# Patient Record
Sex: FEMALE | Race: WHITE | NOT HISPANIC OR LATINO | Employment: UNEMPLOYED | URBAN - METROPOLITAN AREA
[De-identification: names, ages, dates, MRNs, and addresses within clinical notes are randomized per-mention and may not be internally consistent; named-entity substitution may affect disease eponyms.]

---

## 2018-11-13 ENCOUNTER — OFFICE VISIT (OUTPATIENT)
Dept: NEUROSURGERY | Facility: CLINIC | Age: 53
End: 2018-11-13

## 2018-11-13 VITALS
WEIGHT: 194.4 LBS | SYSTOLIC BLOOD PRESSURE: 141 MMHG | HEART RATE: 73 BPM | HEIGHT: 64 IN | DIASTOLIC BLOOD PRESSURE: 83 MMHG | BODY MASS INDEX: 33.19 KG/M2

## 2018-11-13 DIAGNOSIS — M54.16 LUMBAR RADICULOPATHY: Primary | ICD-10-CM

## 2018-11-13 DIAGNOSIS — M51.36 DDD (DEGENERATIVE DISC DISEASE), LUMBAR: ICD-10-CM

## 2018-11-13 PROBLEM — M51.369 DDD (DEGENERATIVE DISC DISEASE), LUMBAR: Status: ACTIVE | Noted: 2018-11-13

## 2018-11-13 PROCEDURE — 99244 OFF/OP CNSLTJ NEW/EST MOD 40: CPT | Performed by: PHYSICIAN ASSISTANT

## 2018-11-13 NOTE — PROGRESS NOTES
Subjective   Patient ID: Tiana Sena is a 53 y.o. female is being seen for consultation today at the request of Jacoby Torres MD  For back and bilateral leg pain. She denies any recent cause or injury but notes she fell 10 years ago down a few stairs injuring her tailbone.  She has not had any recent treatments. She is not taking any pain medication at this time.     Back Pain   This is a chronic problem. The current episode started more than 1 year ago. The problem occurs constantly. The problem has been gradually worsening since onset. The pain is present in the lumbar spine. The quality of the pain is described as aching, cramping and shooting. The pain is at a severity of 7/10. The pain is moderate. The pain is worse during the night. The symptoms are aggravated by position. Associated symptoms include leg pain, numbness (hands ) and tingling (hands). Pertinent negatives include no bladder incontinence or bowel incontinence. She has tried heat, muscle relaxant and ice for the symptoms. The treatment provided mild relief.   Leg Pain    The pain is present in the left thigh and right thigh. The quality of the pain is described as aching and shooting. The pain has been worsening since onset. Associated symptoms include numbness (hands ) and tingling (hands). She has tried ice and heat for the symptoms. The treatment provided mild relief.       The following portions of the patient's history were reviewed and updated as appropriate: allergies, current medications, past family history, past medical history, past social history, past surgical history and problem list.    Review of Systems   Constitutional: Positive for activity change.   HENT: Positive for postnasal drip and rhinorrhea.    Gastrointestinal: Negative for bowel incontinence.   Genitourinary: Positive for frequency. Negative for bladder incontinence.   Musculoskeletal: Positive for back pain (bilateral thigh pain).   Allergic/Immunologic:  Positive for environmental allergies.   Neurological: Positive for tingling (hands) and numbness (hands ).   All other systems reviewed and are negative.      Objective   Physical Exam   Constitutional: She is oriented to person, place, and time. She appears well-developed and well-nourished.   HENT:   Head: Normocephalic and atraumatic.   Right Ear: External ear normal.   Left Ear: External ear normal.   Eyes: Conjunctivae and EOM are normal. Pupils are equal, round, and reactive to light. Right eye exhibits no discharge. Left eye exhibits no discharge.   Neck: Normal range of motion. Neck supple. No tracheal deviation present.   Pulmonary/Chest: Effort normal. No stridor. No respiratory distress.   Musculoskeletal: Normal range of motion. She exhibits no edema, tenderness or deformity.   Neurological: She is alert and oriented to person, place, and time. She has normal strength and normal reflexes. She displays no atrophy, no tremor and normal reflexes. No cranial nerve deficit or sensory deficit. She exhibits normal muscle tone. She displays a negative Romberg sign. She displays no seizure activity. Coordination and gait normal.   No long tract signs   Skin: Skin is warm and dry.   Psychiatric: She has a normal mood and affect. Her behavior is normal. Judgment and thought content normal.   Nursing note and vitals reviewed.    Neurologic Exam     Mental Status   Oriented to person, place, and time.     Cranial Nerves     CN III, IV, VI   Pupils are equal, round, and reactive to light.  Extraocular motions are normal.     Motor Exam     Strength   Strength 5/5 throughout.       Assessment/Plan   Independent Review of Radiographic Studies:  Did review the lumbar spine MRI from October 10, 2018 at [facility].  It does show multilevel lumbar degenerative disc disease.  There is multilevel facet arthropathy.  There is a fairly large left disc herniation at L4-L5 with severe neural foraminal narrowing on  the left and mild to moderate stenosis.     Medical Decision Making:    Ms. Sena was referred to us by Dr. Dominguez for a 2-3 year history of low back pain with bilateral buttock and lateral leg pain.  The back and leg pain are equal in severity.  She denies any weakness or incontinence or numbness or tingling.  She has never had any formal treatment.  She has tried over-the-counter medications including Aleve or Advil and also naproxen.  The pain is described as a constant burning type pain from the back through the legs.  It worsens with any prolonged or repetitive activity and improves minimally with rest.    Review the MRI as discussed above.  She actually has more leg pain on the right side than the left which does not correlate with the MRI findings.  The left-sided nerve compression at L4-L5 is fairly significant but she does not have any weakness.  I suggested that we try some epidural injections and she is aware of the risks of bleeding, infection and headache.  If they fail to help then a myelogram would be indicated.  She will also call in the interim with any questions or concerns.  Tiana was seen today for back pain and leg pain.    Diagnoses and all orders for this visit:    Lumbar radiculopathy  -     Epidural Block    DDD (degenerative disc disease), lumbar  -     Epidural Block      Return in about 2 months (around 1/13/2019).

## 2018-11-30 ENCOUNTER — ANESTHESIA EVENT (OUTPATIENT)
Dept: PAIN MEDICINE | Facility: HOSPITAL | Age: 53
End: 2018-11-30

## 2018-11-30 ENCOUNTER — ANESTHESIA (OUTPATIENT)
Dept: PAIN MEDICINE | Facility: HOSPITAL | Age: 53
End: 2018-11-30

## 2018-11-30 ENCOUNTER — HOSPITAL ENCOUNTER (OUTPATIENT)
Dept: PAIN MEDICINE | Facility: HOSPITAL | Age: 53
Discharge: HOME | End: 2018-11-30
Admitting: ANESTHESIOLOGY

## 2018-11-30 ENCOUNTER — HOSPITAL ENCOUNTER (OUTPATIENT)
Dept: GENERAL RADIOLOGY | Facility: HOSPITAL | Age: 53
Discharge: HOME | End: 2018-11-30

## 2018-11-30 VITALS
HEART RATE: 81 BPM | WEIGHT: 190 LBS | HEIGHT: 64 IN | RESPIRATION RATE: 16 BRPM | DIASTOLIC BLOOD PRESSURE: 82 MMHG | TEMPERATURE: 98.3 F | BODY MASS INDEX: 32.44 KG/M2 | SYSTOLIC BLOOD PRESSURE: 124 MMHG | OXYGEN SATURATION: 95 %

## 2018-11-30 DIAGNOSIS — R52 PAIN: ICD-10-CM

## 2018-11-30 DIAGNOSIS — M54.16 LUMBAR RADICULOPATHY: Primary | ICD-10-CM

## 2018-11-30 PROCEDURE — 25010000002 MIDAZOLAM PER 1 MG: Performed by: ANESTHESIOLOGY

## 2018-11-30 PROCEDURE — 77003 FLUOROGUIDE FOR SPINE INJECT: CPT

## 2018-11-30 PROCEDURE — 25010000002 METHYLPREDNISOLONE PER 80 MG: Performed by: ANESTHESIOLOGY

## 2018-11-30 PROCEDURE — C1755 CATHETER, INTRASPINAL: HCPCS

## 2018-11-30 RX ORDER — FENTANYL CITRATE 50 UG/ML
50 INJECTION, SOLUTION INTRAMUSCULAR; INTRAVENOUS AS NEEDED
Status: DISCONTINUED | OUTPATIENT
Start: 2018-11-30 | End: 2018-12-01 | Stop reason: HOSPADM

## 2018-11-30 RX ORDER — LIDOCAINE HYDROCHLORIDE 10 MG/ML
1 INJECTION, SOLUTION INFILTRATION; PERINEURAL ONCE AS NEEDED
Status: DISCONTINUED | OUTPATIENT
Start: 2018-11-30 | End: 2018-12-01 | Stop reason: HOSPADM

## 2018-11-30 RX ORDER — MIDAZOLAM HYDROCHLORIDE 1 MG/ML
1 INJECTION INTRAMUSCULAR; INTRAVENOUS AS NEEDED
Status: DISCONTINUED | OUTPATIENT
Start: 2018-11-30 | End: 2018-12-01 | Stop reason: HOSPADM

## 2018-11-30 RX ORDER — SODIUM CHLORIDE 0.9 % (FLUSH) 0.9 %
1-10 SYRINGE (ML) INJECTION AS NEEDED
Status: DISCONTINUED | OUTPATIENT
Start: 2018-11-30 | End: 2018-12-01 | Stop reason: HOSPADM

## 2018-11-30 RX ORDER — SODIUM CHLORIDE 0.9 % (FLUSH) 0.9 %
3-10 SYRINGE (ML) INJECTION AS NEEDED
Status: DISCONTINUED | OUTPATIENT
Start: 2018-11-30 | End: 2018-12-01 | Stop reason: HOSPADM

## 2018-11-30 RX ORDER — SODIUM CHLORIDE 0.9 % (FLUSH) 0.9 %
3 SYRINGE (ML) INJECTION EVERY 12 HOURS SCHEDULED
Status: DISCONTINUED | OUTPATIENT
Start: 2018-11-30 | End: 2018-12-01 | Stop reason: HOSPADM

## 2018-11-30 RX ORDER — METHYLPREDNISOLONE ACETATE 80 MG/ML
80 INJECTION, SUSPENSION INTRA-ARTICULAR; INTRALESIONAL; INTRAMUSCULAR; SOFT TISSUE ONCE
Status: COMPLETED | OUTPATIENT
Start: 2018-11-30 | End: 2018-11-30

## 2018-11-30 RX ADMIN — METHYLPREDNISOLONE ACETATE 80 MG: 80 INJECTION, SUSPENSION INTRA-ARTICULAR; INTRALESIONAL; INTRAMUSCULAR; SOFT TISSUE at 12:22

## 2018-11-30 RX ADMIN — MIDAZOLAM HYDROCHLORIDE 2 MG: 2 INJECTION, SOLUTION INTRAMUSCULAR; INTRAVENOUS at 12:19

## 2018-11-30 NOTE — ANESTHESIA PROCEDURE NOTES
PAIN Epidural block    Pre-sedation assessment completed: 11/30/2018 12:16 PM    Patient reassessed immediately prior to procedure    Patient location during procedure: pain clinic  Start Time: 11/30/2018 12:17 PM  Stop Time: 11/30/2018 12:24 PM  Indication:at surgeon's request and procedure for pain  Performed By  Anesthesiologist: Wilmer Staton MD  Preanesthetic Checklist  Completed: patient identified, site marked, surgical consent, pre-op evaluation, timeout performed, IV checked, risks and benefits discussed and monitors and equipment checked  Additional Notes  Post-Op Diagnosis Codes:     * Lumbar disc herniation (M51.26)     * Lumbar degenerative disc disease (M51.36)  Prep:  Pt Position:prone  Sterile Tech:cap, gloves, mask and sterile barrier  Prep:chlorhexidine gluconate and isopropyl alcohol  Monitoring:blood pressure monitoring, continuous pulse oximetry and EKG  Procedure:  Sedation: yes   Approach:left paramedian  Guidance: fluoroscopy  Location:lumbar  Level:4-5  Needle Type:Yi[employer]ai-shopping  Needle Gauge:17 G  Cath Depth at skin:9 cm  Aspiration:negative  Test Dose:negative  Medications:  Depomedrol:80 mg  Preservative Free Saline:4mL    Post Assessment:  Dressing:occlusive dressing applied  Pt Tolerance:patient tolerated the procedure well with no apparent complications  Complications:no

## 2019-01-04 ENCOUNTER — ANESTHESIA (OUTPATIENT)
Dept: PAIN MEDICINE | Facility: HOSPITAL | Age: 54
End: 2019-01-04

## 2019-01-04 ENCOUNTER — HOSPITAL ENCOUNTER (OUTPATIENT)
Dept: PAIN MEDICINE | Facility: HOSPITAL | Age: 54
Discharge: HOME | End: 2019-01-04
Attending: ANESTHESIOLOGY | Admitting: ANESTHESIOLOGY

## 2019-01-04 ENCOUNTER — ANESTHESIA EVENT (OUTPATIENT)
Dept: PAIN MEDICINE | Facility: HOSPITAL | Age: 54
End: 2019-01-04

## 2019-01-04 ENCOUNTER — HOSPITAL ENCOUNTER (OUTPATIENT)
Dept: GENERAL RADIOLOGY | Facility: HOSPITAL | Age: 54
Discharge: HOME | End: 2019-01-04

## 2019-01-04 VITALS
DIASTOLIC BLOOD PRESSURE: 88 MMHG | SYSTOLIC BLOOD PRESSURE: 121 MMHG | OXYGEN SATURATION: 95 % | RESPIRATION RATE: 16 BRPM | HEART RATE: 80 BPM | TEMPERATURE: 98.1 F

## 2019-01-04 DIAGNOSIS — M51.36 DDD (DEGENERATIVE DISC DISEASE), LUMBAR: Primary | ICD-10-CM

## 2019-01-04 DIAGNOSIS — R52 PAIN: ICD-10-CM

## 2019-01-04 DIAGNOSIS — M54.16 LUMBAR RADICULOPATHY: ICD-10-CM

## 2019-01-04 PROCEDURE — 77003 FLUOROGUIDE FOR SPINE INJECT: CPT

## 2019-01-04 PROCEDURE — 25010000002 METHYLPREDNISOLONE PER 80 MG: Performed by: ANESTHESIOLOGY

## 2019-01-04 PROCEDURE — 0 IOPAMIDOL 41 % SOLUTION: Performed by: ANESTHESIOLOGY

## 2019-01-04 PROCEDURE — C1755 CATHETER, INTRASPINAL: HCPCS

## 2019-01-04 PROCEDURE — 25010000002 MIDAZOLAM PER 1 MG: Performed by: ANESTHESIOLOGY

## 2019-01-04 RX ORDER — LIDOCAINE HYDROCHLORIDE 10 MG/ML
1 INJECTION, SOLUTION INFILTRATION; PERINEURAL ONCE
Status: DISCONTINUED | OUTPATIENT
Start: 2019-01-04 | End: 2019-01-05 | Stop reason: HOSPADM

## 2019-01-04 RX ORDER — METHYLPREDNISOLONE ACETATE 80 MG/ML
80 INJECTION, SUSPENSION INTRA-ARTICULAR; INTRALESIONAL; INTRAMUSCULAR; SOFT TISSUE ONCE
Status: COMPLETED | OUTPATIENT
Start: 2019-01-04 | End: 2019-01-04

## 2019-01-04 RX ORDER — MIDAZOLAM HYDROCHLORIDE 1 MG/ML
1 INJECTION INTRAMUSCULAR; INTRAVENOUS
Status: DISCONTINUED | OUTPATIENT
Start: 2019-01-04 | End: 2019-01-05 | Stop reason: HOSPADM

## 2019-01-04 RX ORDER — FENTANYL CITRATE 50 UG/ML
50 INJECTION, SOLUTION INTRAMUSCULAR; INTRAVENOUS AS NEEDED
Status: DISCONTINUED | OUTPATIENT
Start: 2019-01-04 | End: 2019-01-05 | Stop reason: HOSPADM

## 2019-01-04 RX ORDER — SODIUM CHLORIDE 0.9 % (FLUSH) 0.9 %
3 SYRINGE (ML) INJECTION EVERY 12 HOURS SCHEDULED
Status: DISCONTINUED | OUTPATIENT
Start: 2019-01-04 | End: 2019-01-05 | Stop reason: HOSPADM

## 2019-01-04 RX ORDER — SODIUM CHLORIDE 0.9 % (FLUSH) 0.9 %
3-10 SYRINGE (ML) INJECTION AS NEEDED
Status: DISCONTINUED | OUTPATIENT
Start: 2019-01-04 | End: 2019-01-05 | Stop reason: HOSPADM

## 2019-01-04 RX ADMIN — MIDAZOLAM HYDROCHLORIDE 2 MG: 2 INJECTION, SOLUTION INTRAMUSCULAR; INTRAVENOUS at 09:47

## 2019-01-04 RX ADMIN — IOPAMIDOL 10 ML: 408 INJECTION, SOLUTION INTRATHECAL at 09:50

## 2019-01-04 RX ADMIN — METHYLPREDNISOLONE ACETATE 80 MG: 80 INJECTION, SUSPENSION INTRA-ARTICULAR; INTRALESIONAL; INTRAMUSCULAR; SOFT TISSUE at 09:50

## 2019-01-04 NOTE — H&P
[facility]    History and Physical    Patient Name: Tiana Sena  :  1965  MRN:  [id number]  Date of Admission: 2019    Subjective     Patient is a 53 y.o. female presents with chief complaint of chronic, intermitent, moderate low back, hips: bilateral and leg: bilateral pain.  Onset of symptoms was gradual starting several months ago.  Symptoms are associated/aggravated by activity or twisting. Symptoms improve with rest    The following portions of the patients history were reviewed and updated as appropriate: current medications, allergies, past medical history, past surgical history, past family history, past social history and problem list                Objective     Past Medical History:   Past Medical History:   Diagnosis Date   • Asthma    • Fall    • Low back pain    • Osteoarthritis      Past Surgical History:   Past Surgical History:   Procedure Laterality Date   • ANKLE SURGERY     • ENDOMETRIAL ABLATION     • GANGLION CYST EXCISION      ,    • NASAL SEPTUM SURGERY          • TONSILLECTOMY       Family History:   Family History   Problem Relation Age of Onset   • Cancer Father    • Down syndrome Son      Social History:   Social History     Tobacco Use   • Smoking status: Never Smoker   • Smokeless tobacco: Never Used   Substance Use Topics   • Alcohol use: No   • Drug use: No       Vital Signs Range for the last 24 hours  Temperature:     Temp Source:     BP:     Pulse:     Respirations:     SPO2:     O2 Amount (l/min):     O2 Devices     Weight:           --------------------------------------------------------------------------------    Current Outpatient Medications   Medication Sig Dispense Refill   • fexofenadine (ALLEGRA) 180 MG tablet Take 180 mg by mouth Daily.       Current Facility-Administered Medications   Medication Dose Route Frequency Provider Last Rate Last Dose   • sodium chloride 0.9 % flush 3 mL  3 mL Intravenous Q12H Wilmer Staton MD       •  sodium chloride 0.9 % flush 3-10 mL  3-10 mL Intravenous PRN Wilmer Staton MD           --------------------------------------------------------------------------------  Assessment/Plan      Anesthesia Evaluation     Patient summary reviewed and Nursing notes reviewed   NPO Solid Status: > 8 hours      Pain impairs ability to perform ADLs: Sleeping  Modalities previously tried to control pain with limited effectiveness within the last 4-6 weeks: Rest     Airway   Mallampati: II  TM distance: >3 FB  Neck ROM: full  Dental - normal exam     Pulmonary - normal exam   (+) asthma,   Cardiovascular - negative cardio ROS and normal exam        Neuro/Psych- neuro exam normal  (+) numbness,     GI/Hepatic/Renal/Endo - negative ROS     Musculoskeletal (-) normal exam    (+) back pain, radiculopathy  Abdominal  - normal exam   Substance History - negative use     OB/GYN negative ob/gyn ROS         Other   (+) arthritis                Diagnosis and Plan    Treatment Plan  ASA 2      Procedures: Lumbar Epidural Steroid Injection(LESI), With fluoroscopy,       Anesthetic plan and risks discussed with patient.          Diagnosis     * Lumbar degenerative disc disease [M51.36]     * Lumbar radiculopathy [M54.16]     * Protrusion of lumbar intervertebral disc [M51.26]

## 2019-01-04 NOTE — ANESTHESIA PROCEDURE NOTES
PAIN Epidural block    Pre-sedation assessment completed: 1/4/2019 9:42 AM    Patient reassessed immediately prior to procedure    Patient location during procedure: pain clinic  Start Time: 1/4/2019 9:42 AM  Stop Time: 1/4/2019 9:57 AM  Indication:procedure for pain  Performed By  Anesthesiologist: Dimas Jordan MD  Preanesthetic Checklist  Completed: patient identified, site marked, surgical consent, pre-op evaluation, timeout performed, risks and benefits discussed and monitors and equipment checked  Additional Notes  Depomedrol - 80mg    Needle position confirmed by fluoroscopy and epidurogram using 2cc of [id number].    Diagnosis  Post-Op Diagnosis Codes:     * Lumbar degenerative disc disease (M51.36)     * Lumbar radiculopathy (M54.16)     * Protrusion of lumbar intervertebral disc (M51.26)    Prep:  Pt Position:prone  Sterile Tech:cap, gloves, mask and sterile barrier  Prep:chlorhexidine gluconate and isopropyl alcohol  Monitoring:blood pressure monitoring, continuous pulse oximetry and EKG  Procedure:  Sedation: yes   Approach:left paramedian  Guidance: fluoroscopy  Location:lumbar  Level:4-5  Needle Type:Tuohy  Needle Gauge:20  Aspiration:negative  Medications:  Depomedrol:80 mg  Preservative Free Saline:2mL  Isovue:2mL    Post Assessment:  Post-procedure: bandaide.  Pt Tolerance:patient tolerated the procedure well with no apparent complications  Complications:no

## 2019-01-14 ENCOUNTER — OFFICE VISIT (OUTPATIENT)
Dept: NEUROSURGERY | Facility: CLINIC | Age: 54
End: 2019-01-14

## 2019-01-14 VITALS
BODY MASS INDEX: 33.94 KG/M2 | DIASTOLIC BLOOD PRESSURE: 87 MMHG | HEIGHT: 64 IN | SYSTOLIC BLOOD PRESSURE: 138 MMHG | WEIGHT: 198.8 LBS | HEART RATE: 76 BPM

## 2019-01-14 DIAGNOSIS — M51.36 DDD (DEGENERATIVE DISC DISEASE), LUMBAR: ICD-10-CM

## 2019-01-14 DIAGNOSIS — M54.16 LUMBAR RADICULOPATHY: Primary | ICD-10-CM

## 2019-01-14 PROCEDURE — 99213 OFFICE O/P EST LOW 20 MIN: CPT | Performed by: PHYSICIAN ASSISTANT

## 2019-01-14 RX ORDER — IBUPROFEN 200 MG
200 TABLET ORAL EVERY 6 HOURS PRN
COMMUNITY
End: 2020-07-27

## 2019-02-08 ENCOUNTER — HOSPITAL ENCOUNTER (OUTPATIENT)
Dept: GENERAL RADIOLOGY | Facility: HOSPITAL | Age: 54
Discharge: HOME | End: 2019-02-08

## 2019-02-08 ENCOUNTER — HOSPITAL ENCOUNTER (OUTPATIENT)
Dept: PAIN MEDICINE | Facility: HOSPITAL | Age: 54
Discharge: HOME | End: 2019-02-08
Admitting: ANESTHESIOLOGY

## 2019-02-08 ENCOUNTER — ANESTHESIA EVENT (OUTPATIENT)
Dept: PAIN MEDICINE | Facility: HOSPITAL | Age: 54
End: 2019-02-08

## 2019-02-08 ENCOUNTER — ANESTHESIA (OUTPATIENT)
Dept: PAIN MEDICINE | Facility: HOSPITAL | Age: 54
End: 2019-02-08

## 2019-02-08 VITALS
BODY MASS INDEX: 33.8 KG/M2 | DIASTOLIC BLOOD PRESSURE: 83 MMHG | HEART RATE: 84 BPM | SYSTOLIC BLOOD PRESSURE: 126 MMHG | HEIGHT: 64 IN | OXYGEN SATURATION: 94 % | TEMPERATURE: 97.9 F | RESPIRATION RATE: 16 BRPM | WEIGHT: 198 LBS

## 2019-02-08 DIAGNOSIS — M54.16 LUMBAR RADICULOPATHY: ICD-10-CM

## 2019-02-08 DIAGNOSIS — M51.36 DDD (DEGENERATIVE DISC DISEASE), LUMBAR: ICD-10-CM

## 2019-02-08 DIAGNOSIS — R52 PAIN: ICD-10-CM

## 2019-02-08 PROCEDURE — 77003 FLUOROGUIDE FOR SPINE INJECT: CPT

## 2019-02-08 PROCEDURE — C1755 CATHETER, INTRASPINAL: HCPCS

## 2019-02-08 PROCEDURE — 25010000002 METHYLPREDNISOLONE PER 80 MG: Performed by: ANESTHESIOLOGY

## 2019-02-08 PROCEDURE — 25010000002 MIDAZOLAM PER 1 MG: Performed by: ANESTHESIOLOGY

## 2019-02-08 RX ORDER — SODIUM CHLORIDE 0.9 % (FLUSH) 0.9 %
3-10 SYRINGE (ML) INJECTION AS NEEDED
Status: DISCONTINUED | OUTPATIENT
Start: 2019-02-08 | End: 2019-02-09 | Stop reason: HOSPADM

## 2019-02-08 RX ORDER — METHYLPREDNISOLONE ACETATE 80 MG/ML
80 INJECTION, SUSPENSION INTRA-ARTICULAR; INTRALESIONAL; INTRAMUSCULAR; SOFT TISSUE ONCE
Status: COMPLETED | OUTPATIENT
Start: 2019-02-08 | End: 2019-02-08

## 2019-02-08 RX ORDER — FENTANYL CITRATE 50 UG/ML
50 INJECTION, SOLUTION INTRAMUSCULAR; INTRAVENOUS AS NEEDED
Status: DISCONTINUED | OUTPATIENT
Start: 2019-02-08 | End: 2019-02-09 | Stop reason: HOSPADM

## 2019-02-08 RX ORDER — SODIUM CHLORIDE 0.9 % (FLUSH) 0.9 %
1-10 SYRINGE (ML) INJECTION AS NEEDED
Status: DISCONTINUED | OUTPATIENT
Start: 2019-02-08 | End: 2019-02-09 | Stop reason: HOSPADM

## 2019-02-08 RX ORDER — LIDOCAINE HYDROCHLORIDE 10 MG/ML
0.5 INJECTION, SOLUTION INFILTRATION; PERINEURAL ONCE AS NEEDED
Status: DISCONTINUED | OUTPATIENT
Start: 2019-02-08 | End: 2019-02-09 | Stop reason: HOSPADM

## 2019-02-08 RX ORDER — MIDAZOLAM HYDROCHLORIDE 1 MG/ML
1 INJECTION INTRAMUSCULAR; INTRAVENOUS AS NEEDED
Status: DISCONTINUED | OUTPATIENT
Start: 2019-02-08 | End: 2019-02-09 | Stop reason: HOSPADM

## 2019-02-08 RX ORDER — LIDOCAINE HYDROCHLORIDE 10 MG/ML
1 INJECTION, SOLUTION INFILTRATION; PERINEURAL ONCE AS NEEDED
Status: DISCONTINUED | OUTPATIENT
Start: 2019-02-08 | End: 2019-02-09 | Stop reason: HOSPADM

## 2019-02-08 RX ORDER — SODIUM CHLORIDE 0.9 % (FLUSH) 0.9 %
3 SYRINGE (ML) INJECTION EVERY 12 HOURS SCHEDULED
Status: DISCONTINUED | OUTPATIENT
Start: 2019-02-08 | End: 2019-02-09 | Stop reason: HOSPADM

## 2019-02-08 RX ADMIN — METHYLPREDNISOLONE ACETATE 80 MG: 80 INJECTION, SUSPENSION INTRA-ARTICULAR; INTRALESIONAL; INTRAMUSCULAR; SOFT TISSUE at 08:29

## 2019-02-08 RX ADMIN — MIDAZOLAM HYDROCHLORIDE 2 MG: 2 INJECTION, SOLUTION INTRAMUSCULAR; INTRAVENOUS at 08:25

## 2019-02-08 NOTE — ANESTHESIA PROCEDURE NOTES
PAIN Epidural block      Patient reassessed immediately prior to procedure    Patient location during procedure: pain clinic  Start Time: 2/8/2019 8:23 AM  Stop Time: 2/8/2019 8:35 AM  Indication:procedure for pain  Performed By  Anesthesiologist: Brian Oh MD  Preanesthetic Checklist  Completed: patient identified and risks and benefits discussed  Additional Notes  Diagnosis:     Post-Op Diagnosis Codes:     * Lumbar disc displacement without myelopathy (M51.26)     * Lumbar neuritis (M54.16)    Sedation:  Versed 2 mg    A lumbar epidural steroid injection with fluoroscopic guidance was performed.  Under fluoroscopic guidance, the epidural space was identified and accessed, confirmed by loss of resistance to saline.  The above medications were injected uneventfully.    Prep:  Pt Position:prone  Sterile Tech:cap, gloves, mask and sterile barrier  Prep:chlorhexidine gluconate and isopropyl alcohol  Monitoring:blood pressure monitoring, continuous pulse oximetry and EKG  Procedure:  Sedation: yes   Approach:left paramedian  Guidance: fluoroscopy  Location:lumbar  Level:4-5  Needle Type:Tuohy  Needle Gauge:20  Aspiration:negative  Medications:  Depomedrol:80  Preservative Free Saline:1mL    Post Assessment:  Pt Tolerance:patient tolerated the procedure well with no apparent complications  Complications:no

## 2019-02-08 NOTE — H&P
"INTERVAL HISTORY:    The patient returns for another Lumbar epidural steroid injection today.  Receives about 70% relief in her pain decreases to a 3 out of 10 after an injection.  This lasts about 6 weeks before it begins to subside.  Her pain level now is about a 8 or 9 out of 10.  She is scheduled for physical therapy but for over 6 weeks has been taking ibuprofen and rest without relief .  Brianna shows a displaced disc   Current Outpatient Medications on File Prior to Encounter   Medication Sig Dispense Refill   • fexofenadine (ALLEGRA) 180 MG tablet Take 180 mg by mouth Daily.     • ibuprofen (ADVIL,MOTRIN) 200 MG tablet Take 200 mg by mouth Every 6 (Six) Hours As Needed for Mild Pain .       No current facility-administered medications on file prior to encounter.        Past Medical History:   Diagnosis Date   • Asthma    • Fall 2008   • Low back pain    • Osteoarthritis        No hematologic infectious or constitutional symptoms    Exam:  /98 (BP Location: Left arm, Patient Position: Sitting)   Pulse 82   Temp 36.6 °C (97.9 °F) (Oral)   Resp 16   Ht 161.3 cm (63.5\")   Wt 89.8 kg (198 lb)   SpO2 95%   BMI 34.52 kg/m²   Airway Mallampatti 2  Alert and oriented      Diagnosis:  Post-Op Diagnosis Codes:     * Lumbar disc displacement without myelopathy [M51.26]     * Lumbar neuritis [M54.16]    Plan:  Lumbar 4 epidural steroid injection under fluoroscopic guidance    I have encouraged them to continue:  1.  Physical therapy exercises at home as prescribed by physical therapy or from the pain clinic handout (given to the patient).  Continuation of these exercises every day, or multiple times per week, even when the patient has good pain relief, was stressed to the patient as a preventative measure to decrease the frequency and severity of future pain episodes.  2.  Continue pain medicines as already prescribed.  If patient not currently taking any, it is recommended to begin Acetaminophen 1000 mg po q " 8 hours.  If other medicines containing Acetaminophen are currently prescribed, maintain daily dose at 3000mg.    3.  If they can tolerate NSAIDS, it is recommended to take Ibuprofen 600 mg po q 6 hours for 7 days during pain exacerbations.   Alternatively, they may substitute an NSAID of their choice (e.g. Aleve)  4.  Heat and ice to the affected area as tolerated for pain control.  It was discussed that heating pads can cause burns.  5.  Low impact exercise such as walking or water exercise was recommended to maintain overall health and aid in weight control.   6.  Follow up as needed for subsequent injections.  7.  Patient was counseled to abstain from tobacco products.

## 2019-02-12 NOTE — PROGRESS NOTES
Subjective   Patient ID: Tiana Sena is a 53 y.o. female is here today for follow-up on back and bilateral leg pain after third YOLANDA and PT.  She states YOLANDA were not offering long lasting relief.  She has not started PT but has been doing a few home exercises. Mrs. Sena takes Ibuprofen or Aleve prn for pain.     Back Pain   This is a recurrent problem. The problem occurs constantly. The problem has been gradually improving since onset. The pain is present in the lumbar spine. The pain radiates to the left thigh and right thigh. The pain is at a severity of 7/10. The pain is moderate. Exacerbated by: activity  Associated symptoms include leg pain. Pertinent negatives include no bladder incontinence, bowel incontinence, numbness, perianal numbness, tingling or weakness. Treatments tried: 3 YOLANDA  The treatment provided moderate relief.   Leg Pain    The incident occurred more than 1 week ago. There was no injury mechanism. The pain is present in the left thigh and right thigh. The pain is at a severity of 5/10. The pain is moderate. The pain has been improving since onset. Pertinent negatives include no muscle weakness, numbness or tingling. Treatments tried: 3 YOLANDA  The treatment provided moderate relief.       The following portions of the patient's history were reviewed and updated as appropriate: allergies, current medications, past family history, past medical history, past social history, past surgical history and problem list.    Review of Systems   Gastrointestinal: Negative for bowel incontinence.   Genitourinary: Negative for bladder incontinence and difficulty urinating.   Musculoskeletal: Positive for back pain (bilateral leg pain ).   Neurological: Negative for tingling, weakness and numbness.   All other systems reviewed and are negative.      Objective   Physical Exam   Constitutional: She is oriented to person, place, and time. She appears well-developed and well-nourished.   HENT:   Head:  Normocephalic and atraumatic.   Right Ear: External ear normal.   Left Ear: External ear normal.   Eyes: Conjunctivae and EOM are normal. Pupils are equal, round, and reactive to light. Right eye exhibits no discharge. Left eye exhibits no discharge.   Neck: Normal range of motion. Neck supple. No tracheal deviation present.   Cardiovascular: Intact distal pulses.   Pulmonary/Chest: Effort normal. No stridor. No respiratory distress.   Musculoskeletal: Normal range of motion. She exhibits no edema, tenderness or deformity.   Neurological: She is alert and oriented to person, place, and time. She has normal strength and normal reflexes. She displays no atrophy, no tremor and normal reflexes. No cranial nerve deficit or sensory deficit. She exhibits normal muscle tone. She displays a negative Romberg sign. She displays no seizure activity. Coordination and gait normal.   No long tract signs   Skin: Skin is warm and dry.   Psychiatric: She has a normal mood and affect. Her behavior is normal. Judgment and thought content normal.   Nursing note and vitals reviewed.    Neurologic Exam     Mental Status   Oriented to person, place, and time.     Cranial Nerves     CN III, IV, VI   Pupils are equal, round, and reactive to light.  Extraocular motions are normal.     Motor Exam     Strength   Strength 5/5 throughout.       Assessment/Plan   Independent Review of Radiographic Studies:      Medical Decision Making:    Ms. Sena was here a few months ago for a 2-3 year history of low back pain with bilateral buttock and lateral leg pain. Her MRI showed multilevel lumbar degenerative disc disease and facet arthropathy. There was a fairly large left disc herniation at L4-L5 with severe neural foraminal narrowing on the left and mild to moderate stenosis.  She was referred for epidurals and has now had 3 epidurals with moderate relief.  The low back pain is still constant and varies in severity.  She continues to have right  lateral thigh pain but it is no longer constant and far less severe.  She does now also complaining of left lateral thigh pain which certainly correlates with the left disc herniation.  Thankfully that pain is also intermittent and mild.  Her exam does not reveal any weakness and she denies weakness or bowel or bladder dysfunction. Last time she was here we discussed and ordered PT but she has not yet started it for financial reasons.  At this point even though the radicular pain has improved her back pain is getting progressively worse and the radicular symptoms are still present with prolonged or repetitive activity.  We again discussed the myelogram as well as the associated risks of bleeding, infection and headache.  She would like to proceed to determine if surgery is indicated.  She will follow-up thereafter with Dr. Vaughn to discuss potential surgical intervention.        Tiana was seen today for back pain and leg pain.    Diagnoses and all orders for this visit:    Lumbar radiculopathy  -     No Lab Testing Needed; Standing  -     dexamethasone (DECADRON) 4 MG tablet; Take 2 tablets by mouth Take As Directed. Take both tablets by mouth 2 hours before myelogram  -     XR Spine Lumbar Complete With Flex & Ext; Future  -     IR Myelogram Lumbar Spine; Future  -     CT Lumbar Spine Without Contrast; Future    DDD (degenerative disc disease), lumbar  -     No Lab Testing Needed; Standing  -     dexamethasone (DECADRON) 4 MG tablet; Take 2 tablets by mouth Take As Directed. Take both tablets by mouth 2 hours before myelogram  -     XR Spine Lumbar Complete With Flex & Ext; Future  -     IR Myelogram Lumbar Spine; Future  -     CT Lumbar Spine Without Contrast; Future      Return for follow up after radiology test with Dr. Vaughn.

## 2019-02-25 ENCOUNTER — OFFICE VISIT (OUTPATIENT)
Dept: NEUROSURGERY | Facility: CLINIC | Age: 54
End: 2019-02-25

## 2019-02-25 VITALS
SYSTOLIC BLOOD PRESSURE: 133 MMHG | DIASTOLIC BLOOD PRESSURE: 87 MMHG | HEART RATE: 75 BPM | WEIGHT: 198 LBS | HEIGHT: 64 IN | BODY MASS INDEX: 33.8 KG/M2

## 2019-02-25 DIAGNOSIS — M51.36 DDD (DEGENERATIVE DISC DISEASE), LUMBAR: ICD-10-CM

## 2019-02-25 DIAGNOSIS — M54.16 LUMBAR RADICULOPATHY: Primary | ICD-10-CM

## 2019-02-25 PROCEDURE — 99214 OFFICE O/P EST MOD 30 MIN: CPT | Performed by: PHYSICIAN ASSISTANT

## 2019-02-25 RX ORDER — DEXAMETHASONE 4 MG/1
8 TABLET ORAL TAKE AS DIRECTED
Qty: 2 TABLET | Refills: 0 | Status: SHIPPED | OUTPATIENT
Start: 2019-02-25 | End: 2019-03-07 | Stop reason: HOSPADM

## 2019-03-01 ENCOUNTER — APPOINTMENT (OUTPATIENT)
Dept: PAIN MEDICINE | Facility: HOSPITAL | Age: 54
End: 2019-03-01

## 2019-03-07 ENCOUNTER — HOSPITAL ENCOUNTER (OUTPATIENT)
Dept: RADIOLOGY | Facility: HOSPITAL | Age: 54
Discharge: HOME | End: 2019-03-07

## 2019-03-07 ENCOUNTER — HOSPITAL ENCOUNTER (OUTPATIENT)
Dept: GENERAL RADIOLOGY | Facility: HOSPITAL | Age: 54
Discharge: HOME | End: 2019-03-07
Admitting: NEUROLOGICAL SURGERY

## 2019-03-07 ENCOUNTER — HOSPITAL ENCOUNTER (OUTPATIENT)
Dept: CT IMAGING | Facility: HOSPITAL | Age: 54
Discharge: HOME | End: 2019-03-07

## 2019-03-07 ENCOUNTER — HOSPITAL ENCOUNTER (OUTPATIENT)
Dept: GENERAL RADIOLOGY | Facility: HOSPITAL | Age: 54
Discharge: HOME | End: 2019-03-07

## 2019-03-07 VITALS
HEART RATE: 78 BPM | DIASTOLIC BLOOD PRESSURE: 81 MMHG | SYSTOLIC BLOOD PRESSURE: 125 MMHG | TEMPERATURE: 97.3 F | BODY MASS INDEX: 34.15 KG/M2 | HEIGHT: 64 IN | OXYGEN SATURATION: 97 % | WEIGHT: 200 LBS | RESPIRATION RATE: 16 BRPM

## 2019-03-07 DIAGNOSIS — M54.16 LUMBAR RADICULOPATHY: ICD-10-CM

## 2019-03-07 DIAGNOSIS — M51.36 DDD (DEGENERATIVE DISC DISEASE), LUMBAR: ICD-10-CM

## 2019-03-07 PROCEDURE — 0 IOPAMIDOL 41 % SOLUTION: Performed by: NEUROLOGICAL SURGERY

## 2019-03-07 PROCEDURE — 72240 MYELOGRAPHY NECK SPINE: CPT

## 2019-03-07 PROCEDURE — 72131 CT LUMBAR SPINE W/O DYE: CPT

## 2019-03-07 PROCEDURE — 62304 MYELOGRAPHY LUMBAR INJECTION: CPT

## 2019-03-07 PROCEDURE — 72114 X-RAY EXAM L-S SPINE BENDING: CPT

## 2019-03-07 RX ORDER — HYDROCODONE BITARTRATE AND ACETAMINOPHEN 5; 325 MG/1; MG/1
1 TABLET ORAL EVERY 4 HOURS PRN
Status: DISCONTINUED | OUTPATIENT
Start: 2019-03-07 | End: 2019-03-08 | Stop reason: HOSPADM

## 2019-03-07 RX ORDER — ACETAMINOPHEN 325 MG/1
650 TABLET ORAL EVERY 4 HOURS PRN
Status: DISCONTINUED | OUTPATIENT
Start: 2019-03-07 | End: 2019-03-08 | Stop reason: HOSPADM

## 2019-03-07 RX ORDER — LIDOCAINE HYDROCHLORIDE 10 MG/ML
10 INJECTION, SOLUTION INFILTRATION; PERINEURAL ONCE
Status: COMPLETED | OUTPATIENT
Start: 2019-03-07 | End: 2019-03-07

## 2019-03-07 RX ADMIN — LIDOCAINE HYDROCHLORIDE 6 ML: 10 INJECTION, SOLUTION INFILTRATION; PERINEURAL at 07:39

## 2019-03-07 RX ADMIN — IOPAMIDOL 20 ML: 408 INJECTION, SOLUTION INTRATHECAL at 07:39

## 2019-03-07 RX ADMIN — HYDROCODONE BITARTATE AND ACETAMINOPHEN 1 TABLET: 5; 325 TABLET ORAL at 09:02

## 2019-03-07 NOTE — NURSING NOTE
Verbal and written D/C instructions given to patient and .  She voices understanding and are able to teach back D/C instructions.

## 2019-03-07 NOTE — DISCHARGE INSTRUCTIONS
EDUCATION /DISCHARGE INSTRUCTIONS:    A myelogram is a special radiology procedure of the spinal cord, spinal nerves and other related structures.  You will be awake during the examination.  An area of your lower back will be cleansed with an antiseptic solution.  The physician will inject a numbing medication in your lower back.  While your back is numb, a needle will be placed in the lower back area.  A small amount of spinal fluid may be withdrawn and sent to the lab if ordered by your physician. While the needle is in the back, an injection of a contrast material (xray dye) will be given through the needle.  The contrast material will allow the physician to see the spinal cord and spinal nerves.  Once injected, the needle will be removed and a band aid will be placed over the injection site.  The table will be tilted during the process to allow the contrast material to flow to particular areas in the spine.  Following the injection and xrays, you will be taken to the CT scan where more pictures will be taken. After the procedure is finished, the contrast material will be absorbed by your body and eliminated through your kidneys.  The radiologist will study and interpret your myelogram and send the results to your physician.    Procedure risks of a myelogram include, but are not limited to:  *  Bleeding   *  seizure  *  Infection   *  Headache, possibly severe requiring  *  Contrast reaction      a blood patch  *  Nerve or cord injury  *  Paralysis and death    Benefits of the procedure:  *  Best examination for delineating pathology related to spinal cord compression from a disc and/or nerve root compression    Alternatives to the procedure:  MRI - a non invasive procedure requiring intravenous contrast injection.  Cannot be done on patients with certain pacemakers or metal in the body.  MRI risks include possible reaction to the contrast material, movement of metal located in the body.  Benefit to MRI:   Non-invasive and usually painless procedure.  THIS EDUCATION INFORMATION WAS REVIEWED PRIOR TO PROCEDURE AND CONSENT. Patient initials________________Time_____0722_____________    Important information following your myelogram:    24 hour rest period ends ___1100_________________.    * ACTIVITY:   *  Lie down with your head elevated no more than 2 pillows high today & tonight  *  Sit up to eat your meals and use the restroom, otherwise, lie down.  *  Remain less active for two to three days.  *  Do not drive for 48 hours following a myelogram  *  You may remove the bandage and shower in the morning  *  Increase your fluids for the next 24 hours.  Caffeinated drinks are encouraged.    Resume taking blood thinner or aspirin on __3/8/19 after 1100_____________________________    CALL YOUR PHYSICIAN FOR THE FOLLOWING:  * Pain at the injections site  * Reddness, swelling, bruising or drainage at the injection site.  * A fever by mouth of 101.0  * Any new symptoms    Headaches are a common side effect after a myelogram.  If you get a headache, you should stay flat in bed and drink plenty of fluids. If the headache persist and does not go away with rest/medication, CALL Dr. Vaughn at [phone]

## 2019-03-08 ENCOUNTER — TELEPHONE (OUTPATIENT)
Dept: INTERVENTIONAL RADIOLOGY/VASCULAR | Facility: HOSPITAL | Age: 54
End: 2019-03-08

## 2019-03-13 NOTE — PROGRESS NOTES
Subjective   Patient ID: Tiana Sena is a 53 y.o. female is here today for follow-up with a new Lumbar Myelogram that was ordered for back and bilateral leg pain.    History of Present Illness    This patient continues with pain in her back with radiation into her legs.  It is mostly in the left leg at this point but when it first started it was in the right.  The pain is sharp and stabbing and quite severe.    The following portions of the patient's history were reviewed and updated as appropriate: allergies, current medications, past family history, past medical history, past social history, past surgical history and problem list.    Review of Systems   Respiratory: Negative for chest tightness and shortness of breath.    Cardiovascular: Negative for chest pain.   Musculoskeletal: Positive for back pain.        Bilateral leg pain   All other systems reviewed and are negative.      Objective   Physical Exam   Constitutional: She is oriented to person, place, and time. She appears well-developed and well-nourished.   Neurological: She is oriented to person, place, and time.     Neurologic Exam     Mental Status   Oriented to person, place, and time.       Assessment/Plan   Independent Review of Radiographic Studies:      I reviewed her plain films, myelogram, and CT scan done on 7 March of this year.  The plain films show some degenerative change but not severe.  There is no scoliosis and no abnormal movement on flexion and extension.  On the myelogram itself there is some decreased density in the contrast column on the left side at L4-5 on the AP film.  There is a little narrowing of the lateral recess at that same level on the left and especially with the standing films.  This gets much more significant when she does stand up and there is severe lateral recess stenosis bilaterally at that level.  There is a little pressure on the nerve on the right side at L5-S1.  There is significant disc bulging at that  level on the lateral films.  On the post myelographic CT scan of the lumbar spine there is fairly severe narrowing of the lateral recess at L4-5 consistent with the myelogram films that were taken prone.  It is worse on the left than the right with some disc bulging consistent also with the myelogram films.  L5-S1 mostly looks okay.  There may be a little lateral recess narrowing on the right.    Medical Decision Making:      I told the patient about the test.  I told her that first of all there is a significant difference between prone and standing on the myelogram films.  This is an indication that unless she loses weight there is virtually no chance of getting her back better and keeping it better.  She seemed a little offended at this comment and I tried to explain to her that it is a purely mechanical problem based on the stress placed on the lower spine.    I told her that if the pain is bad enough so as to warrant surgery at this point it would be reasonable to consider either a bilateral aggressive decompression at L4-5 with a fusion or since most of her pain is on the left side minimally invasive surgery just on the left side with a crossover to the right is much as possible.  I told the patient about the risks, complications and expected outcome of the lumbar surgery.  I explained that there was an 80% chance of getting rid of the pain in the leg.  I explained that there would still be back pain after the surgery.  Initially this will be quite severe but will improve over time.  There is a 2 or 3% chance of infection, bleeding, CSF leak, damage to the nerve as a result of surgery, paralysis, as well as anesthetic risk.  There is a 5% chance of late instability which could require a fusion later on and a 10% chance of recurrent disc herniation.  We discussed the postoperative hospital and home course.  I recommended the more minimally invasive surgery.  She would like to think about it and will call if she  wishes for us to do anything further for her.    If she calls she will need to be scheduled for a: Left L4-5 laminectomy and discectomy using metrx    Tiana was seen today for back pain and leg pain.    Diagnoses and all orders for this visit:    Lumbar radiculopathy      Return for 2-3 week post op.

## 2019-03-14 ENCOUNTER — OFFICE VISIT (OUTPATIENT)
Dept: NEUROSURGERY | Facility: CLINIC | Age: 54
End: 2019-03-14

## 2019-03-14 VITALS — SYSTOLIC BLOOD PRESSURE: 144 MMHG | DIASTOLIC BLOOD PRESSURE: 91 MMHG | HEART RATE: 100 BPM

## 2019-03-14 DIAGNOSIS — M54.16 LUMBAR RADICULOPATHY: Primary | ICD-10-CM

## 2019-03-14 PROCEDURE — 99213 OFFICE O/P EST LOW 20 MIN: CPT | Performed by: NEUROLOGICAL SURGERY

## 2019-11-04 ENCOUNTER — TELEPHONE (OUTPATIENT)
Dept: NEUROSURGERY | Facility: CLINIC | Age: 54
End: 2019-11-04

## 2019-11-04 NOTE — TELEPHONE ENCOUNTER
Pt does not want to see Dr Vaughn. She says Dr Vaughn did not introduce himself and pointed his finger at her and said she needs to lose weight before he will operate on her  Pt wants to see Conchita   She tried to explain to him that she had gained the weight after she hurt her back. He told her that did not matter.   Pt wants to see a different doctor.    Spoke with Zeina and she said Dr Arzate will not see  2nd opinions and Dr MARISCAL is out until March. She said for pt to call her PCP.    . Pt is very upset. She says that she has been badly treated and doesn't understand why she should have to go to her PCP.   Please advise

## 2020-05-08 ENCOUNTER — TRANSCRIBE ORDERS (OUTPATIENT)
Dept: ADMINISTRATIVE | Facility: HOSPITAL | Age: 55
End: 2020-05-08

## 2020-05-08 DIAGNOSIS — M47.814 THORACIC SPONDYLOSIS: Primary | ICD-10-CM

## 2020-05-28 ENCOUNTER — APPOINTMENT (OUTPATIENT)
Dept: PAIN MEDICINE | Facility: HOSPITAL | Age: 55
End: 2020-05-28

## 2020-06-11 ENCOUNTER — ANESTHESIA (OUTPATIENT)
Dept: PAIN MEDICINE | Facility: HOSPITAL | Age: 55
End: 2020-06-11

## 2020-06-11 ENCOUNTER — HOSPITAL ENCOUNTER (OUTPATIENT)
Dept: GENERAL RADIOLOGY | Facility: HOSPITAL | Age: 55
Discharge: HOME | End: 2020-06-11

## 2020-06-11 ENCOUNTER — HOSPITAL ENCOUNTER (OUTPATIENT)
Dept: PAIN MEDICINE | Facility: HOSPITAL | Age: 55
Discharge: HOME | End: 2020-06-11
Admitting: ANESTHESIOLOGY

## 2020-06-11 ENCOUNTER — ANESTHESIA EVENT (OUTPATIENT)
Dept: PAIN MEDICINE | Facility: HOSPITAL | Age: 55
End: 2020-06-11

## 2020-06-11 VITALS
TEMPERATURE: 97.7 F | OXYGEN SATURATION: 98 % | HEIGHT: 63 IN | SYSTOLIC BLOOD PRESSURE: 130 MMHG | BODY MASS INDEX: 35.97 KG/M2 | WEIGHT: 203 LBS | HEART RATE: 77 BPM | DIASTOLIC BLOOD PRESSURE: 94 MMHG | RESPIRATION RATE: 16 BRPM

## 2020-06-11 DIAGNOSIS — M48.04 THORACIC SPINAL STENOSIS: Primary | ICD-10-CM

## 2020-06-11 DIAGNOSIS — M54.14 THORACIC RADICULOPATHY: ICD-10-CM

## 2020-06-11 DIAGNOSIS — R52 PAIN: ICD-10-CM

## 2020-06-11 PROCEDURE — 25010000002 MIDAZOLAM PER 1 MG: Performed by: ANESTHESIOLOGY

## 2020-06-11 PROCEDURE — 77003 FLUOROGUIDE FOR SPINE INJECT: CPT

## 2020-06-11 PROCEDURE — 25010000002 DEXAMETHASONE SODIUM PHOSPHATE 10 MG/ML SOLUTION: Performed by: ANESTHESIOLOGY

## 2020-06-11 PROCEDURE — 0 IOPAMIDOL 41 % SOLUTION: Performed by: ANESTHESIOLOGY

## 2020-06-11 RX ORDER — COVID-19 ANTIGEN TEST
2 KIT MISCELLANEOUS DAILY
COMMUNITY
End: 2021-03-22

## 2020-06-11 RX ORDER — DEXAMETHASONE SODIUM PHOSPHATE 10 MG/ML
10 INJECTION, SOLUTION INTRAMUSCULAR; INTRAVENOUS ONCE
Status: COMPLETED | OUTPATIENT
Start: 2020-06-11 | End: 2020-06-11

## 2020-06-11 RX ORDER — SODIUM CHLORIDE 0.9 % (FLUSH) 0.9 %
3 SYRINGE (ML) INJECTION EVERY 12 HOURS SCHEDULED
Status: DISCONTINUED | OUTPATIENT
Start: 2020-06-11 | End: 2020-06-12 | Stop reason: HOSPADM

## 2020-06-11 RX ORDER — FENTANYL CITRATE 50 UG/ML
50 INJECTION, SOLUTION INTRAMUSCULAR; INTRAVENOUS AS NEEDED
Status: DISCONTINUED | OUTPATIENT
Start: 2020-06-11 | End: 2020-06-12 | Stop reason: HOSPADM

## 2020-06-11 RX ORDER — SODIUM CHLORIDE 0.9 % (FLUSH) 0.9 %
3-10 SYRINGE (ML) INJECTION AS NEEDED
Status: DISCONTINUED | OUTPATIENT
Start: 2020-06-11 | End: 2020-06-12 | Stop reason: HOSPADM

## 2020-06-11 RX ORDER — LIDOCAINE HYDROCHLORIDE 10 MG/ML
1 INJECTION, SOLUTION INFILTRATION; PERINEURAL ONCE
Status: COMPLETED | OUTPATIENT
Start: 2020-06-11 | End: 2020-06-11

## 2020-06-11 RX ORDER — ESTRADIOL 1 MG/1
1 TABLET ORAL DAILY
COMMUNITY
Start: 2020-05-22

## 2020-06-11 RX ORDER — MIDAZOLAM HYDROCHLORIDE 1 MG/ML
1 INJECTION INTRAMUSCULAR; INTRAVENOUS AS NEEDED
Status: DISCONTINUED | OUTPATIENT
Start: 2020-06-11 | End: 2020-06-12 | Stop reason: HOSPADM

## 2020-06-11 RX ADMIN — IOPAMIDOL 10 ML: 408 INJECTION, SOLUTION INTRATHECAL at 14:10

## 2020-06-11 RX ADMIN — LIDOCAINE HYDROCHLORIDE 5 ML: 10 INJECTION, SOLUTION EPIDURAL; INFILTRATION; INTRACAUDAL; PERINEURAL at 14:09

## 2020-06-11 RX ADMIN — DEXAMETHASONE SODIUM PHOSPHATE 10 MG: 10 INJECTION INTRAMUSCULAR; INTRAVENOUS at 14:10

## 2020-06-11 RX ADMIN — MIDAZOLAM 2 MG: 1 INJECTION INTRAMUSCULAR; INTRAVENOUS at 14:01

## 2020-06-11 NOTE — H&P
CHIEF COMPLAINT:  Thoracic back pain        HISTORY OF PRESENT ILLNESS:  This patient is complaining of thoracic back pain which radiates right greater than left approximately the T10-T11 level.  It ranges between a 5 and 9 out of 10 on the pain scale.  The pain is described as constant, numb, pressure, tingling, and throbbing in nature. The pain is exacerbated by activity, exercise, lifting, running, sitting, standing, straining, twisting, and walking, and relieved by sleep.  The patient has tried conservative therapy for greater than 6 weeks including: Ice, rest, heat, over-the-counter medication, prescription medication, steroids, and physical therapy.  The pain is significantly decreasing the patient's Activities of Daily Living.  Diagnostic imaging specifically an MRI of her thoracic spine which was signed on 4/6/2020 by the radiologist shows multilevel degenerative changes most pronounced at T10/T11 with moderate to severe bilateral foraminal stenosis.  The full dictation by the radiologist is available in the chart.    This patient was referred to our clinic by MARGIE Samaniego and Melquiades Herring MD for bilateral thoracic transforaminal epidural steroid injections at T10-T11.       PAST MEDICAL HISTORY:  Current Outpatient Medications on File Prior to Encounter   Medication Sig Dispense Refill   • estradiol (ESTRACE) 1 MG tablet Take 1 mg by mouth Daily.     • fexofenadine (ALLEGRA) 180 MG tablet Take 180 mg by mouth Daily.     • Naproxen Sodium 220 MG capsule Take 2 tablets by mouth Daily.     • progesterone (PROMETRIUM) 200 MG capsule 200 mg Daily.     • ibuprofen (ADVIL,MOTRIN) 200 MG tablet Take 200 mg by mouth Every 6 (Six) Hours As Needed for Mild Pain .       No current facility-administered medications on file prior to encounter.        Past Medical History:   Diagnosis Date   • Asthma    • Fall 2008   • Low back pain    • Osteoarthritis        SOCIAL HISTORY:  Counseled to avoid tobacco     REVIEW OF  "SYSTEMS:  No pertinent hematologic, infectious, or constitutional symptoms.  Other review of systems non-contributory     PHYSICAL EXAM:  Ht 160 cm (63\")   Wt 92.1 kg (203 lb)   BMI 35.96 kg/m²   Constitutional: Well-developed well-nourished no acute distress  General: Alert and oriented  Ophtho: EOMI  Airway: Mallampati 2  Cardiac:  Regular rate  Lungs:  Clear to auscultation bilaterally   GI: soft, nontender  Sacroiliac Joints: Noncontributory  Musc: Decreased range of motion and tenderness palpation  Neuro: This patient is describing radicular pain approximately the T10 or T11 level right greater than left going around from the back to the side of the ribs and somewhat anteriorly        DIAGNOSIS:  Post-Op Diagnosis Codes:  Post-Op Diagnosis Codes:     * Thoracic spinal stenosis [M48.04]     * Thoracic radiculopathy [M54.14]     PLAN:  -  Bilateral transforaminal thoracic epidural steroid injections at the planned level of T10-T11 spaced approximately 2-4 weeks apart.  If pain control is acceptable after injection, it was discussed with the patient that they may return for the subsequent injections if and when their pain returns.    - Risks were discussed with the patient, including but not limited to: failure of relief, worsening pain, tissue, nerve, blood vessel or spinal cord damage, post-dural-puncture headache, pneumothorax, bleeding, epidural hematoma, infection, and epidural abscess. Benefits and alternatives were also discussed. No promises were made. Risks/benefits/alternatives of procedural medications were also discussed, including but not limited to hyperglycemia, fluid retention, decreased immune system, osteoporosis, and anaphylactoid reactions. The patient voiced understanding and agreed to proceed.   -  Physical therapy exercises at home should be considered, as tolerated, as prescribed by physical therapy or from the pain clinic handout (given to the patient).  Continuation of these exercises " every day, or multiple times per week, even when the patient has good pain relief, was stressed to the patient as a preventative measure to decrease the frequency and severity of future pain episodes.  -  It is recommended that the patient use the least amount of opioid medication possible.     -  Heat and ice to the affected area as tolerated for pain control.  It was discussed that heating pads can cause burns.  -  Daily low impact exercise such as walking or water exercise was recommended to maintain overall health and aid in weight control.   -  Patient was counseled to abstain from tobacco products.  -  Follow up as needed for subsequent injections.      Cherelle Llanos M.D., [facility], [facility]  Board Certified in Pain Medicine by the American Board of Anesthesiology  Board Certified in Anesthesiology by the American Board of Anesthesiology     Much of this encounter note is an electronic transcription/translation of spoken language to printed text. The electronic translation of spoken language may permit erroneous, or at times, nonsensical words or phrases to be inadvertently transcribed. Although I have reviewed the note for such errors, some may still exist.

## 2020-06-11 NOTE — ANESTHESIA PROCEDURE NOTES
Bilateral transforaminal thoracic epidural steroid injections    Pre-sedation assessment completed: 6/11/2020 1:54 PM    Patient reassessed immediately prior to procedure    Patient location during procedure: pain clinic  Start Time: 6/11/2020 2:00 PM  Stop Time: 6/11/2020 2:11 PM  Indication:procedure for pain  Performed By  Anesthesiologist: Zeus Ingram MD  Preanesthetic Checklist  Completed: patient identified, site marked, surgical consent, pre-op evaluation, timeout performed, IV checked, risks and benefits discussed and monitors and equipment checked  Additional Notes  Post-Op Diagnosis Codes:     * Thoracic spinal stenosis (M48.04)     * Thoracic radiculopathy (M54.14)    The patient was observed in recovery with no evidence of neurological deficits or other problems. All questions were answered. The patient was discharged with appropriate instructions.  Prep:  Pt Position:prone  Sterile Tech:cap, gloves, mask and sterile barrier  Prep:chlorhexidine gluconate and isopropyl alcohol  Monitoring:blood pressure monitoring, continuous pulse oximetry and EKG  Procedure:Sedation: yes     Approach: Classic approaches from the right and left sides.  Guidance: fluoroscopy  Location:thoracic  Level:10-11  Needle Type:Other (Coude Epimed Blunt)  Needle Gauge:20 G  Aspiration:negative  Medications:  Analgesia: Preservative Free Dexamethasone 10mg.  Preservative Free Saline:1mL  Isovue:1mL  Comments:Appropriate spread of contrast.  1 cc of preservative free lidocaine and 5 mg of preservative-free dexamethasone was injected on each side for a total of 2 cc of lidocaine and 10 mg of dexamethasone.    Post Assessment:  Post-procedure: Dressing per nursing.  Pt Tolerance:patient tolerated the procedure well with no apparent complications  Complications:no

## 2020-07-27 ENCOUNTER — HOSPITAL ENCOUNTER (OUTPATIENT)
Dept: PAIN MEDICINE | Facility: HOSPITAL | Age: 55
Discharge: HOME | End: 2020-07-27
Admitting: ANESTHESIOLOGY

## 2020-07-27 ENCOUNTER — ANESTHESIA EVENT (OUTPATIENT)
Dept: PAIN MEDICINE | Facility: HOSPITAL | Age: 55
End: 2020-07-27

## 2020-07-27 ENCOUNTER — HOSPITAL ENCOUNTER (OUTPATIENT)
Dept: GENERAL RADIOLOGY | Facility: HOSPITAL | Age: 55
Discharge: HOME | End: 2020-07-27

## 2020-07-27 ENCOUNTER — ANESTHESIA (OUTPATIENT)
Dept: PAIN MEDICINE | Facility: HOSPITAL | Age: 55
End: 2020-07-27

## 2020-07-27 VITALS
DIASTOLIC BLOOD PRESSURE: 91 MMHG | RESPIRATION RATE: 16 BRPM | TEMPERATURE: 98 F | SYSTOLIC BLOOD PRESSURE: 141 MMHG | HEART RATE: 78 BPM | OXYGEN SATURATION: 96 %

## 2020-07-27 DIAGNOSIS — M48.04 THORACIC SPINAL STENOSIS: ICD-10-CM

## 2020-07-27 DIAGNOSIS — M54.14 THORACIC RADICULOPATHY: ICD-10-CM

## 2020-07-27 DIAGNOSIS — M47.816 SPONDYLOSIS OF LUMBAR REGION WITHOUT MYELOPATHY OR RADICULOPATHY: Primary | ICD-10-CM

## 2020-07-27 DIAGNOSIS — R52 PAIN: ICD-10-CM

## 2020-07-27 PROCEDURE — G0463 HOSPITAL OUTPT CLINIC VISIT: HCPCS

## 2020-07-27 RX ORDER — SODIUM CHLORIDE 0.9 % (FLUSH) 0.9 %
3 SYRINGE (ML) INJECTION EVERY 12 HOURS SCHEDULED
Status: DISCONTINUED | OUTPATIENT
Start: 2020-07-27 | End: 2020-07-28 | Stop reason: HOSPADM

## 2020-07-27 RX ORDER — SODIUM CHLORIDE 0.9 % (FLUSH) 0.9 %
3-10 SYRINGE (ML) INJECTION AS NEEDED
Status: DISCONTINUED | OUTPATIENT
Start: 2020-07-27 | End: 2020-07-28 | Stop reason: HOSPADM

## 2020-07-27 NOTE — ANESTHESIA PROCEDURE NOTES
No procedure was performed today because we are awaiting insurance approval      Patient reassessed immediately prior to procedure    Performed by  Anesthesiologist: Zeus Ingram MD          No procedure was performed today because we are awaiting insurance approval

## 2020-07-27 NOTE — H&P
CHIEF COMPLAINT:  Low back pain        HISTORY OF PRESENT ILLNESS:  This patient is complaining of mid and low back pain which radiates in a bandlike pattern across her lower thoracic and lumbar spine.    Her pain ranges between a 6 and 9 out of 10.  The pain is significantly decreasing the patient's Activities of Daily Living.    Her post myelogram CT from 3/7/2019 was reviewed.  There is multilevel lumbar degeneration and facet arthropathy.  She has had a prior bilateral L4-L5 lumbar discectomy in December 2019.    This patient had a lumbar epidural steroid injection from a different pain group.  She then presented to me and received a bilateral thoracic transforaminal epidural steroid injection at the T10-T11 level per the request of MARGIE Samaniego.  Unfortunately, neither the epidural performed at the outside facility or the bilateral transforaminal epidural steroid injection performed by me were helpful for this patient.    She has since been back to see Dr. Herring who is requesting consideration of interventional procedures to help with pain possibly emanating from her lumbar facets.       PAST MEDICAL HISTORY:  Current Outpatient Medications on File Prior to Encounter   Medication Sig Dispense Refill   • estradiol (ESTRACE) 1 MG tablet Take 1 mg by mouth Daily.     • fexofenadine (ALLEGRA) 180 MG tablet Take 180 mg by mouth Daily.     • Naproxen Sodium 220 MG capsule Take 2 tablets by mouth Daily.     • progesterone (PROMETRIUM) 200 MG capsule 200 mg Daily.     • [DISCONTINUED] ibuprofen (ADVIL,MOTRIN) 200 MG tablet Take 200 mg by mouth Every 6 (Six) Hours As Needed for Mild Pain .       No current facility-administered medications on file prior to encounter.        Past Medical History:   Diagnosis Date   • Asthma    • Fall 2008   • Low back pain    • Osteoarthritis    • Thoracic radiculopathy 6/11/2020   • Thoracic spinal stenosis 6/11/2020       SOCIAL HISTORY:  Counseled to avoid tobacco     REVIEW OF  SYSTEMS:  No pertinent hematologic, infectious, or constitutional symptoms.  Other review of systems non-contributory     PHYSICAL EXAM:  /91 (BP Location: Left arm, Patient Position: Sitting)   Pulse 78   Temp 36.7 °C (98 °F) (Oral)   Resp 16   SpO2 96%   Constitutional: Well-developed well-nourished no acute distress  General: Alert and oriented  Ophtho: EOMI  Airway: Mallampati 2  Cardiac:  Regular rate  Lungs:  Clear to auscultation bilaterally   GI: soft, nontender  Cervical Spine: Noncontributory  Sacroiliac Joints: Noncontributory  Musc: Tenderness to palpation over lower thoracic and lumbar facets  Neuro: Lumbar facet loading is positive bilaterally.  She seems to be the worst at L3-L4, L4-L5, and L5-S1.     DIAGNOSIS:  Post-Op Diagnosis Codes:  Post-Op Diagnosis Codes:     * Spondylosis of lumbar region without myelopathy or radiculopathy [M47.816]     * History of back surgery [Z98.890]     PLAN:  -She is unable to have a procedure today because of insurance approval reasons.  We will plan for bilateral Lumbar facet medial branch injections to cover the level(s) of L3-L4, L4-L5, and L5-S1 spaced approximately 2-4 weeks apart. These injections will be for diagnostic purposes. If pain control is acceptable but temporary, it was discussed with the patient that they may be a candidate for radiofrequency lesioning in the future.     - Risks were discussed with the patient, including but not limited to: failure of relief, worsening pain, tissue, nerve, or blood vessel damage, bleeding, infection, and abscess formation. Benefits and alternatives were also discussed. No promises were made. The patient voiced understanding.  -  Physical therapy exercises at home should be considered, as tolerated, as prescribed by physical therapy or from the pain clinic handout (given to the patient).  Continuation of these exercises every day, or multiple times per week, even when the patient has good pain relief, was  stressed to the patient as a preventative measure to decrease the frequency and severity of future pain episodes.  -  It is recommended that the patient use the least amount of opioid medication possible.     -  Heat and ice to the affected area as tolerated for pain control.  It was discussed that heating pads can cause burns.  -  Daily low impact exercise such as walking or water exercise was recommended to maintain overall health and aid in weight control.   -  Patient was counseled to abstain from tobacco products.  -  Follow up as needed for subsequent injections.  -I will be happy to consider future interventional options per the request of MARGIE Samaniego and/or Melquiades Herring MD.  - She has had a prior bilateral L4-L5 lumbar discectomy in December 2019.      Zeus Ingram M.D.  Partner, Sean and AGUILAR [facility], [facility]  Board Certified in Pain Medicine by the American Board of Anesthesiology  Board Certified in Anesthesiology by the American Board of Anesthesiology     Much of this encounter note is an electronic transcription/translation of spoken language to printed text. The electronic translation of spoken language may permit erroneous, or at times, nonsensical words or phrases to be inadvertently transcribed. Although I have reviewed the note for such errors, some may still exist.

## 2020-08-21 ENCOUNTER — ANESTHESIA (OUTPATIENT)
Dept: PAIN MEDICINE | Facility: HOSPITAL | Age: 55
End: 2020-08-21

## 2020-08-21 ENCOUNTER — HOSPITAL ENCOUNTER (OUTPATIENT)
Dept: GENERAL RADIOLOGY | Facility: HOSPITAL | Age: 55
Discharge: HOME | End: 2020-08-21

## 2020-08-21 ENCOUNTER — ANESTHESIA EVENT (OUTPATIENT)
Dept: PAIN MEDICINE | Facility: HOSPITAL | Age: 55
End: 2020-08-21

## 2020-08-21 ENCOUNTER — HOSPITAL ENCOUNTER (OUTPATIENT)
Dept: PAIN MEDICINE | Facility: HOSPITAL | Age: 55
Discharge: HOME | End: 2020-08-21
Admitting: ANESTHESIOLOGY

## 2020-08-21 VITALS
SYSTOLIC BLOOD PRESSURE: 116 MMHG | TEMPERATURE: 98.6 F | OXYGEN SATURATION: 99 % | HEART RATE: 81 BPM | RESPIRATION RATE: 16 BRPM | DIASTOLIC BLOOD PRESSURE: 86 MMHG

## 2020-08-21 DIAGNOSIS — M47.816 SPONDYLOSIS OF LUMBAR REGION WITHOUT MYELOPATHY OR RADICULOPATHY: ICD-10-CM

## 2020-08-21 DIAGNOSIS — R52 PAIN: ICD-10-CM

## 2020-08-21 PROCEDURE — 77003 FLUOROGUIDE FOR SPINE INJECT: CPT

## 2020-08-21 PROCEDURE — 25010000002 ROPIVACAINE PER 1 MG: Performed by: ANESTHESIOLOGY

## 2020-08-21 PROCEDURE — 25010000002 FENTANYL CITRATE (PF) 100 MCG/2ML SOLUTION: Performed by: ANESTHESIOLOGY

## 2020-08-21 PROCEDURE — 25010000002 MIDAZOLAM PER 1 MG: Performed by: ANESTHESIOLOGY

## 2020-08-21 RX ORDER — SODIUM CHLORIDE 0.9 % (FLUSH) 0.9 %
3-10 SYRINGE (ML) INJECTION AS NEEDED
Status: DISCONTINUED | OUTPATIENT
Start: 2020-08-21 | End: 2020-08-22 | Stop reason: HOSPADM

## 2020-08-21 RX ORDER — FEXOFENADINE HCL 180 MG/1
TABLET ORAL
COMMUNITY

## 2020-08-21 RX ORDER — MIDAZOLAM HYDROCHLORIDE 1 MG/ML
1 INJECTION INTRAMUSCULAR; INTRAVENOUS AS NEEDED
Status: DISCONTINUED | OUTPATIENT
Start: 2020-08-21 | End: 2020-08-22 | Stop reason: HOSPADM

## 2020-08-21 RX ORDER — SODIUM CHLORIDE 0.9 % (FLUSH) 0.9 %
3 SYRINGE (ML) INJECTION EVERY 12 HOURS SCHEDULED
Status: DISCONTINUED | OUTPATIENT
Start: 2020-08-21 | End: 2020-08-22 | Stop reason: HOSPADM

## 2020-08-21 RX ORDER — FENTANYL CITRATE 50 UG/ML
50 INJECTION, SOLUTION INTRAMUSCULAR; INTRAVENOUS AS NEEDED
Status: DISCONTINUED | OUTPATIENT
Start: 2020-08-21 | End: 2020-08-22 | Stop reason: HOSPADM

## 2020-08-21 RX ORDER — LIDOCAINE HYDROCHLORIDE 10 MG/ML
1 INJECTION, SOLUTION INFILTRATION; PERINEURAL ONCE
Status: DISCONTINUED | OUTPATIENT
Start: 2020-08-21 | End: 2020-08-22 | Stop reason: HOSPADM

## 2020-08-21 RX ORDER — ROPIVACAINE HYDROCHLORIDE 5 MG/ML
20 INJECTION, SOLUTION EPIDURAL; INFILTRATION; PERINEURAL ONCE
Status: COMPLETED | OUTPATIENT
Start: 2020-08-21 | End: 2020-08-21

## 2020-08-21 RX ADMIN — FENTANYL CITRATE 50 MCG: 50 INJECTION, SOLUTION INTRAMUSCULAR; INTRAVENOUS at 09:30

## 2020-08-21 RX ADMIN — ROPIVACAINE HYDROCHLORIDE 30 ML: 5 INJECTION, SOLUTION EPIDURAL; INFILTRATION; PERINEURAL at 09:40

## 2020-08-21 RX ADMIN — MIDAZOLAM 2 MG: 1 INJECTION INTRAMUSCULAR; INTRAVENOUS at 09:30

## 2020-08-21 NOTE — ANESTHESIA PROCEDURE NOTES
Bilateral lumbar facet medial branch diagnostic injections to cover the levels of L3-L4, L4-L5, and L5-S1    Pre-sedation assessment completed: 8/21/2020 9:21 AM    Patient reassessed immediately prior to procedure    Patient location during procedure: Pain Clinic  Start time: 8/21/2020 9:26 AM  Stop Time: 8/21/2020 9:40 AM    Reason for block: procedure for pain  Performed by  Anesthesiologist: Zeus Ingram MD  Preanesthetic Checklist  Completed: patient identified, site marked, surgical consent, pre-op evaluation, timeout performed, IV checked, risks and benefits discussed and monitors and equipment checked  Prep:  Patient position: prone  Sterile barriers:cap, gloves, mask and sterile barrier  Prep: ChloraPrep  Patient monitoring: blood pressure monitoring, continuous pulse oximetry and EKG  Procedure:  Sedation:yes  Approach:bilateral  Guidance:fluoroscopy  Location:lumbar  Interspace: to cover the levels of L3-L4, L4-L5, and L5-S1.  Needle Type:Quincke  Needle Gauge:25 G  Aspiration:negative  Medications:  Comment:With 0.5% Ropivacaine, 0.5 mls per injection site.     Post Assessment  Injection Assessment: negative  Patient Tolerance:comfortable throughout block  Complications:no  Additional Notes  Post-Op Diagnosis Codes:     * Spondylosis of lumbar region without myelopathy or radiculopathy (M47.816)     * History of back surgery (Z98.890)    The patient was observed in recovery with no evidence of neurological deficits or other problems. All questions were answered. The patient was discharged with appropriate instructions.

## 2020-08-21 NOTE — H&P
CHIEF COMPLAINT:  Low back pain        HISTORY OF PRESENT ILLNESS:  This patient is complaining of mid and low back pain which radiates in a bandlike pattern across her lower thoracic and lumbar spine.    Her pain ranges between a 6 and 9 out of 10.  The pain is significantly decreasing the patient's Activities of Daily Living.    Her post myelogram CT from 3/7/2019 was reviewed.  There is multilevel lumbar degeneration and facet arthropathy.  She has had a prior bilateral L4-L5 lumbar discectomy in December 2019.     This patient had a lumbar epidural steroid injection from a different pain group.  She then presented to me and received a bilateral thoracic transforaminal epidural steroid injection at the T10-T11 level per the request of MARGIE Samaniego.  Unfortunately, neither the epidural performed at the outside facility or the bilateral transforaminal epidural steroid injection performed by me were helpful for this patient.     She has since been back to see Dr. Herring who is requesting consideration of interventional procedures to help with pain possibly emanating from her lumbar facets.     PAST MEDICAL HISTORY:  Current Outpatient Medications on File Prior to Encounter   Medication Sig Dispense Refill   • estradiol (ESTRACE) 1 MG tablet Take 1 mg by mouth Daily.     • fexofenadine (ALLEGRA) 180 MG tablet Take 180 mg by mouth Daily.     • Naproxen Sodium 220 MG capsule Take 2 tablets by mouth Daily.     • progesterone (PROMETRIUM) 200 MG capsule 200 mg Daily.       No current facility-administered medications on file prior to encounter.        Past Medical History:   Diagnosis Date   • Asthma    • Fall 2008   • Low back pain    • Osteoarthritis    • Spondylosis of lumbar region without myelopathy or radiculopathy 7/27/2020   • Thoracic radiculopathy 6/11/2020   • Thoracic spinal stenosis 6/11/2020       SOCIAL HISTORY:  Counseled to avoid tobacco     REVIEW OF SYSTEMS:  No pertinent hematologic, infectious,  or constitutional symptoms.  Other review of systems non-contributory     PHYSICAL EXAM:  /95 (BP Location: Left arm, Patient Position: Sitting)   Pulse 84   Temp 37 °C (98.6 °F) (Oral)   Resp 16   SpO2 96%   Constitutional: Well-developed well-nourished no acute distress  General: Alert and oriented  Ophtho: EOMI  Airway: Mallampati 2  Cardiac:  Regular rate  Lungs:  Clear to auscultation bilaterally   GI: soft, nontender  Cervical Spine: Noncontributory  Sacroiliac Joints: Noncontributory  Musc: Tenderness palpation over the lower thoracic and lumbar facets  Neuro: Lumbar facet loading is positive bilaterally particularly at the lower 3 levels.     DIAGNOSIS:  Post-Op Diagnosis Codes:  Post-Op Diagnosis Codes:     * Spondylosis of lumbar region without myelopathy or radiculopathy [M47.816]     * History of back surgery [Z98.890]     PLAN:  -Bilateral Lumbar facet medial branch injections to cover the level(s) of L3-L4, L4-L5, and L5-S1 spaced approximately 2-4 weeks apart. These injections will be for diagnostic purposes. If pain control is acceptable but temporary, it was discussed with the patient that they may be a candidate for radiofrequency lesioning in the future.     - Risks were discussed with the patient, including but not limited to: failure of relief, worsening pain, tissue, nerve, or blood vessel damage, bleeding, infection, and abscess formation. Benefits and alternatives were also discussed. No promises were made. The patient voiced understanding.  -  Physical therapy exercises at home should be considered, as tolerated, as prescribed by physical therapy or from the pain clinic handout (given to the patient).  Continuation of these exercises every day, or multiple times per week, even when the patient has good pain relief, was stressed to the patient as a preventative measure to decrease the frequency and severity of future pain episodes.  -  It is recommended that the patient use the  least amount of opioid medication possible.     -  Heat and ice to the affected area as tolerated for pain control.  It was discussed that heating pads can cause burns.  -  Daily low impact exercise such as walking or water exercise was recommended to maintain overall health and aid in weight control.   -  Patient was counseled to abstain from tobacco products.  -  Follow up as needed for subsequent injections.  -I will be happy to consider future interventional options per the request of MARGIE Samaniego and/or Melquiades Herring MD.  - She has had a prior bilateral L4-L5 lumbar discectomy in December 2019.  -She is continuing to have pain in her lower thoracic region as well and is requesting that we consider future thoracic facet treatments at the lower levels.  We have decided to go ahead and focus on the lumbar area today.    Zeus Ingram M.D.  Jos[facility], [facility], [facility]  Board Certified in Pain Medicine by the American Board of Anesthesiology  Board Certified in Anesthesiology by the American Board of Anesthesiology     Much of this encounter note is an electronic transcription/translation of spoken language to printed text. The electronic translation of spoken language may permit erroneous, or at times, nonsensical words or phrases to be inadvertently transcribed. Although I have reviewed the note for such errors, some may still exist.

## 2020-09-03 ENCOUNTER — APPOINTMENT (OUTPATIENT)
Dept: PAIN MEDICINE | Facility: HOSPITAL | Age: 55
End: 2020-09-03

## 2020-09-28 ENCOUNTER — HOSPITAL ENCOUNTER (OUTPATIENT)
Dept: PAIN MEDICINE | Facility: HOSPITAL | Age: 55
Discharge: HOME | End: 2020-09-28

## 2020-09-28 ENCOUNTER — ANESTHESIA (OUTPATIENT)
Dept: PAIN MEDICINE | Facility: HOSPITAL | Age: 55
End: 2020-09-28

## 2020-09-28 ENCOUNTER — ANESTHESIA EVENT (OUTPATIENT)
Dept: PAIN MEDICINE | Facility: HOSPITAL | Age: 55
End: 2020-09-28

## 2020-09-28 ENCOUNTER — HOSPITAL ENCOUNTER (OUTPATIENT)
Dept: GENERAL RADIOLOGY | Facility: HOSPITAL | Age: 55
Discharge: HOME | End: 2020-09-28

## 2020-09-28 VITALS
DIASTOLIC BLOOD PRESSURE: 77 MMHG | HEART RATE: 75 BPM | RESPIRATION RATE: 14 BRPM | SYSTOLIC BLOOD PRESSURE: 116 MMHG | OXYGEN SATURATION: 95 % | TEMPERATURE: 97.5 F

## 2020-09-28 DIAGNOSIS — R52 PAIN: ICD-10-CM

## 2020-09-28 DIAGNOSIS — M47.816 SPONDYLOSIS OF LUMBAR REGION WITHOUT MYELOPATHY OR RADICULOPATHY: Primary | ICD-10-CM

## 2020-09-28 PROCEDURE — 25010000002 FENTANYL CITRATE (PF) 100 MCG/2ML SOLUTION: Performed by: ANESTHESIOLOGY

## 2020-09-28 PROCEDURE — 25010000002 ROPIVACAINE PER 1 MG: Performed by: ANESTHESIOLOGY

## 2020-09-28 PROCEDURE — 25010000002 MIDAZOLAM PER 1 MG: Performed by: ANESTHESIOLOGY

## 2020-09-28 PROCEDURE — 77003 FLUOROGUIDE FOR SPINE INJECT: CPT

## 2020-09-28 RX ORDER — MIDAZOLAM HYDROCHLORIDE 1 MG/ML
1 INJECTION INTRAMUSCULAR; INTRAVENOUS AS NEEDED
Status: DISCONTINUED | OUTPATIENT
Start: 2020-09-28 | End: 2020-09-29 | Stop reason: HOSPADM

## 2020-09-28 RX ORDER — SODIUM CHLORIDE 0.9 % (FLUSH) 0.9 %
3-10 SYRINGE (ML) INJECTION AS NEEDED
Status: DISCONTINUED | OUTPATIENT
Start: 2020-09-28 | End: 2020-09-29 | Stop reason: HOSPADM

## 2020-09-28 RX ORDER — ROPIVACAINE HYDROCHLORIDE 5 MG/ML
20 INJECTION, SOLUTION EPIDURAL; INFILTRATION; PERINEURAL ONCE
Status: COMPLETED | OUTPATIENT
Start: 2020-09-28 | End: 2020-09-28

## 2020-09-28 RX ORDER — FENTANYL CITRATE 50 UG/ML
50 INJECTION, SOLUTION INTRAMUSCULAR; INTRAVENOUS AS NEEDED
Status: DISCONTINUED | OUTPATIENT
Start: 2020-09-28 | End: 2020-09-29 | Stop reason: HOSPADM

## 2020-09-28 RX ORDER — SODIUM CHLORIDE 0.9 % (FLUSH) 0.9 %
3 SYRINGE (ML) INJECTION EVERY 12 HOURS SCHEDULED
Status: DISCONTINUED | OUTPATIENT
Start: 2020-09-28 | End: 2020-09-29 | Stop reason: HOSPADM

## 2020-09-28 RX ORDER — LIDOCAINE HYDROCHLORIDE 10 MG/ML
1 INJECTION, SOLUTION INFILTRATION; PERINEURAL ONCE
Status: DISCONTINUED | OUTPATIENT
Start: 2020-09-28 | End: 2020-09-29 | Stop reason: HOSPADM

## 2020-09-28 RX ADMIN — FENTANYL CITRATE 50 MCG: 50 INJECTION, SOLUTION INTRAMUSCULAR; INTRAVENOUS at 09:18

## 2020-09-28 RX ADMIN — ROPIVACAINE HYDROCHLORIDE 30 ML: 5 INJECTION, SOLUTION EPIDURAL; INFILTRATION; PERINEURAL at 08:20

## 2020-09-28 RX ADMIN — MIDAZOLAM 2 MG: 1 INJECTION INTRAMUSCULAR; INTRAVENOUS at 09:18

## 2020-09-28 NOTE — ANESTHESIA PROCEDURE NOTES
Bilateral diagnostic lumbar facet medial branch blocks to cover the levels of L3-L4, L4-L5, and L5-S1    Pre-sedation assessment completed: 9/28/2020 9:09 AM    Patient reassessed immediately prior to procedure    Patient location during procedure: Pain Clinic  Start time: 9/28/2020 9:19 AM  Stop Time: 9/28/2020 9:30 AM    Reason for block: procedure for pain  Performed by  Anesthesiologist: Zeus Ingram MD  Preanesthetic Checklist  Completed: patient identified, site marked, surgical consent, pre-op evaluation, timeout performed, IV checked, risks and benefits discussed and monitors and equipment checked  Prep:  Patient position: prone  Sterile barriers:cap, gloves, mask and sterile barrier  Prep: ChloraPrep  Patient monitoring: blood pressure monitoring, continuous pulse oximetry and EKG  Procedure:  Sedation:yes  Approach:bilateral  Guidance:fluoroscopy  Location:lumbar  Interspace: to cover the levels of L3-L4, L4-L5, and L5-S1.  Needle Type:Quincke  Needle Gauge:25 G  Aspiration:negative  Medications:  Comment:With 0.5% Ropivacaine, 0.5 mls per injection site.     Post Assessment  Injection Assessment: negative  Patient Tolerance:comfortable throughout block  Complications:no  Additional Notes  Post-Op Diagnosis Codes:     * Spondylosis of lumbar region without myelopathy or radiculopathy (M47.816)     * History of back surgery (Z98.890)    The patient was observed in recovery with no evidence of neurological deficits or other problems. All questions were answered. The patient was discharged with appropriate instructions.

## 2020-09-28 NOTE — H&P
CHIEF COMPLAINT:  Low back pain        HISTORY OF PRESENT ILLNESS:  This patient is complaining of low back pain which radiates in a bandlike pattern across her lower thoracic and lumbar spine areas.  It ranges between a 5 and 9 out of 10 on the pain scale.  The pain is significantly decreasing the patient's Activities of Daily Living.       Her post myelogram CT from 3/7/2019 was reviewed.  There is multilevel lumbar degeneration and facet arthropathy.  She has had a prior bilateral L4-L5 lumbar discectomy in December 2019.    This patient had a lumbar epidural steroid injection from a different pain group.  She then presented to me and received a bilateral thoracic transforaminal epidural steroid injection at the T10-T11 level per the request of MARGIE Samaniego.  Unfortunately, neither the epidural performed at the outside facility or the bilateral transforaminal epidural steroid injection performed by me were helpful for this patient.     She had since been back to see Dr. Herring who requested consideration of interventional procedures to help with pain possibly emanating from her lumbar and/or thoracic facets.    At her last visit, we performed bilateral lumbar facet medial branch blocks to cover the levels of L3-L4, L4-L5, and L5-S1.  This patient states that the relief was good for several hours after the procedure as would be expected.  She states that the relief then faded after the diagnostic local anesthetic wore off.  Unfortunately, she is having a little trouble putting an exact percentage on her relief since she also has some pain in her lower thoracic area.    The patient is requesting that we perform diagnostic blocks one more time as planned.  She will try to pay extra close attention this time so that she can give us a more exact percentage of pain relief for the lumbar facet pain.     PAST MEDICAL HISTORY:  Current Outpatient Medications on File Prior to Encounter   Medication Sig Dispense Refill    • B Complex Vitamins (VITAMIN B COMPLEX PO) Take  by mouth.     • estradiol (ESTRACE) 1 MG tablet Take 1 mg by mouth Daily.     • fexofenadine (ALLEGRA) 180 MG tablet fexofenadine 180 mg tablet   Take 1 tablet every day by oral route.     • Naproxen Sodium 220 MG capsule Take 2 tablets by mouth Daily.     • Probiotic Product (PROBIOTIC DAILY PO) Take  by mouth.     • progesterone (PROMETRIUM) 200 MG capsule 200 mg Daily.     • VITAMIN D PO Take  by mouth.     • VITAMIN E PO Take  by mouth.     • [DISCONTINUED] fexofenadine (ALLEGRA) 180 MG tablet Take 180 mg by mouth Daily.       No current facility-administered medications on file prior to encounter.        Past Medical History:   Diagnosis Date   • Asthma    • Fall 2008   • Low back pain    • Osteoarthritis    • Spondylosis of lumbar region without myelopathy or radiculopathy 7/27/2020   • Thoracic radiculopathy 6/11/2020   • Thoracic spinal stenosis 6/11/2020       SOCIAL HISTORY:  Counseled to avoid tobacco     REVIEW OF SYSTEMS:  No pertinent hematologic, infectious, or constitutional symptoms.  Other review of systems non-contributory     PHYSICAL EXAM:  /81 (BP Location: Left arm, Patient Position: Sitting)   Pulse 80   Temp 36.4 °C (97.5 °F) (Infrared)   Resp 14   SpO2 96%   Constitutional: Well-developed well-nourished no acute distress  General: Alert and oriented  Ophtho: EOMI  Airway: Mallampati 2  Cardiac:  Regular rate  Lungs:  Clear to auscultation bilaterally   GI: soft, nontender  Cervical Spine: Noncontributory  Sacroiliac Joints: Noncontributory  Musc: Tenderness palpation over the lower thoracic and lumbar facets  Neuro: Facet loading is positive bilaterally particularly the lower 3 lumbar levels.        DIAGNOSIS:  Post-Op Diagnosis Codes:  Post-Op Diagnosis Codes:     * Spondylosis of lumbar region without myelopathy or radiculopathy [M47.816]     * History of back surgery [Z98.890]     PLAN:  -Bilateral Lumbar facet medial branch  injections to cover the level(s) of L3-L4, L4-L5, and L5-S1 spaced approximately 2-4 weeks apart. These injections will be for diagnostic purposes. If pain control is good but temporary, it was discussed with the patient that she will be a candidate for radiofrequency lesioning in the future.     - Risks were discussed with the patient, including but not limited to: failure of relief, worsening pain, tissue, nerve, or blood vessel damage, bleeding, infection, and abscess formation. Benefits and alternatives were also discussed. No promises were made. The patient voiced understanding.  -  Physical therapy exercises at home should be considered, as tolerated, as prescribed by physical therapy or from the pain clinic handout (given to the patient).  Continuation of these exercises every day, or multiple times per week, even when the patient has good pain relief, was stressed to the patient as a preventative measure to decrease the frequency and severity of future pain episodes.  -  It is recommended that the patient use the least amount of opioid medication possible.     -  Heat and ice to the affected area as tolerated for pain control.  It was discussed that heating pads can cause burns.  -  Daily low impact exercise such as walking or water exercise was recommended to maintain overall health and aid in weight control.   -  Patient was counseled to abstain from tobacco products.  -  Follow up as needed for subsequent injections.  -I will be happy to consider future interventional options per the request of MARGIE Samaniego and/or Melquiades Herring MD.  - She has had a prior bilateral L4-L5 lumbar discectomy in December 2019.  -She is continuing to have pain in her lower thoracic region as well and is requesting that we consider future thoracic facet treatments at the lower levels.  We have decided to continue to focus on the lumbar area today.    Zeus Ingram M.D.  Partner, [facility], [facility], [facility]  Board Certified in Pain Medicine by the American Board of Anesthesiology  Board Certified in Anesthesiology by the American Board of Anesthesiology     Much of this encounter note is an electronic transcription/translation of spoken language to printed text. The electronic translation of spoken language may permit erroneous, or at times, nonsensical words or phrases to be inadvertently transcribed. Although I have reviewed the note for such errors, some may still exist.

## 2020-10-01 ENCOUNTER — TELEPHONE (OUTPATIENT)
Dept: PAIN MEDICINE | Facility: HOSPITAL | Age: 55
End: 2020-10-01

## 2020-10-01 NOTE — TELEPHONE ENCOUNTER
Patient called her h/a since her treatment on Monday bilateral diagnostic lumbar facet medial blocls to cover L3L4, L4L5,and L5S1 by Dr. Zeus Ingram. She states her come and go and are not characteristic of a spinal h/a. When talking with Dr. Zeus Ingram he said that the medial branch block would not cause a spinal h/a

## 2020-10-01 NOTE — TELEPHONE ENCOUNTER
Pt called c/o of off and on H/a since she had her treatment on Monday September 29 from Dr. Ingram.Pt had a bilateral medial block branch on L3L4, L4L5 and L5S1. When talked with Dr. Ingram he said that that procedure is not near the spinal fluid and that procedure would not casue a spinal h/a like some other procedures could. Explained that she should reat and take medication like tylenol to see if that helped if it continues to call her primary or if if it gets bad she could always go the ER. Pt verbalized understanding.

## 2020-11-05 ENCOUNTER — ANESTHESIA EVENT (OUTPATIENT)
Dept: PAIN MEDICINE | Facility: HOSPITAL | Age: 55
End: 2020-11-05

## 2020-11-05 ENCOUNTER — HOSPITAL ENCOUNTER (OUTPATIENT)
Dept: PAIN MEDICINE | Facility: HOSPITAL | Age: 55
Discharge: HOME | End: 2020-11-05

## 2020-11-05 ENCOUNTER — ANESTHESIA (OUTPATIENT)
Dept: PAIN MEDICINE | Facility: HOSPITAL | Age: 55
End: 2020-11-05

## 2020-11-05 ENCOUNTER — HOSPITAL ENCOUNTER (OUTPATIENT)
Dept: GENERAL RADIOLOGY | Facility: HOSPITAL | Age: 55
Discharge: HOME | End: 2020-11-05

## 2020-11-05 VITALS
OXYGEN SATURATION: 95 % | WEIGHT: 165 LBS | BODY MASS INDEX: 29.23 KG/M2 | HEART RATE: 73 BPM | SYSTOLIC BLOOD PRESSURE: 118 MMHG | RESPIRATION RATE: 16 BRPM | TEMPERATURE: 97.5 F | HEIGHT: 63 IN | DIASTOLIC BLOOD PRESSURE: 78 MMHG

## 2020-11-05 DIAGNOSIS — M47.816 SPONDYLOSIS OF LUMBAR REGION WITHOUT MYELOPATHY OR RADICULOPATHY: ICD-10-CM

## 2020-11-05 DIAGNOSIS — R52 PAIN: ICD-10-CM

## 2020-11-05 PROCEDURE — 25010000002 FENTANYL CITRATE (PF) 100 MCG/2ML SOLUTION: Performed by: ANESTHESIOLOGY

## 2020-11-05 PROCEDURE — 25010000002 MIDAZOLAM PER 1 MG: Performed by: ANESTHESIOLOGY

## 2020-11-05 PROCEDURE — 77003 FLUOROGUIDE FOR SPINE INJECT: CPT

## 2020-11-05 RX ORDER — LIDOCAINE HYDROCHLORIDE 20 MG/ML
10 INJECTION, SOLUTION INFILTRATION; PERINEURAL ONCE
Status: COMPLETED | OUTPATIENT
Start: 2020-11-05 | End: 2020-11-05

## 2020-11-05 RX ORDER — MIDAZOLAM HYDROCHLORIDE 1 MG/ML
1 INJECTION INTRAMUSCULAR; INTRAVENOUS AS NEEDED
Status: DISCONTINUED | OUTPATIENT
Start: 2020-11-05 | End: 2020-11-06 | Stop reason: HOSPADM

## 2020-11-05 RX ORDER — LIDOCAINE HYDROCHLORIDE 10 MG/ML
1 INJECTION, SOLUTION INFILTRATION; PERINEURAL ONCE
Status: DISCONTINUED | OUTPATIENT
Start: 2020-11-05 | End: 2020-11-06 | Stop reason: HOSPADM

## 2020-11-05 RX ORDER — SODIUM CHLORIDE 0.9 % (FLUSH) 0.9 %
3 SYRINGE (ML) INJECTION EVERY 12 HOURS SCHEDULED
Status: DISCONTINUED | OUTPATIENT
Start: 2020-11-05 | End: 2020-11-06 | Stop reason: HOSPADM

## 2020-11-05 RX ORDER — LIDOCAINE HYDROCHLORIDE 10 MG/ML
10 INJECTION, SOLUTION INFILTRATION; PERINEURAL ONCE
Status: COMPLETED | OUTPATIENT
Start: 2020-11-05 | End: 2020-11-05

## 2020-11-05 RX ORDER — SODIUM CHLORIDE 0.9 % (FLUSH) 0.9 %
3-10 SYRINGE (ML) INJECTION AS NEEDED
Status: DISCONTINUED | OUTPATIENT
Start: 2020-11-05 | End: 2020-11-06 | Stop reason: HOSPADM

## 2020-11-05 RX ORDER — FENTANYL CITRATE 50 UG/ML
50 INJECTION, SOLUTION INTRAMUSCULAR; INTRAVENOUS AS NEEDED
Status: DISCONTINUED | OUTPATIENT
Start: 2020-11-05 | End: 2020-11-06 | Stop reason: HOSPADM

## 2020-11-05 RX ADMIN — MIDAZOLAM 2 MG: 1 INJECTION INTRAMUSCULAR; INTRAVENOUS at 08:53

## 2020-11-05 RX ADMIN — LIDOCAINE HYDROCHLORIDE 5 ML: 20 INJECTION, SOLUTION EPIDURAL; INFILTRATION; INTRACAUDAL; PERINEURAL at 09:06

## 2020-11-05 RX ADMIN — LIDOCAINE HYDROCHLORIDE 5 ML: 10 INJECTION, SOLUTION EPIDURAL; INFILTRATION; INTRACAUDAL; PERINEURAL at 08:56

## 2020-11-05 RX ADMIN — FENTANYL CITRATE 50 MCG: 50 INJECTION, SOLUTION INTRAMUSCULAR; INTRAVENOUS at 08:54

## 2020-11-05 NOTE — H&P
CHIEF COMPLAINT:  Low back pain        HISTORY OF PRESENT ILLNESS:  This patient is complaining of low back pain which radiates in a bandlike pattern across her lower thoracic and lumbar spine areas.  It ranges between a 5 and 9 out of 10 on the pain scale.  The pain is significantly decreasing the patient's Activities of Daily Living.      Her post myelogram CT from 3/7/2019 was reviewed.  There is multilevel lumbar degeneration and facet arthropathy.  She has had a prior bilateral L4-L5 lumbar discectomy in December 2019.    This patient had a lumbar epidural steroid injection from a different pain group.  She then presented to me and received a bilateral thoracic transforaminal epidural steroid injection at the T10-T11 level per the request of MARGIE Samaniego.  Unfortunately, neither the epidural performed at the outside facility or the bilateral transforaminal epidural steroid injection performed by me were helpful for this patient.     She had since been back to see Dr. Herring who requested consideration of interventional procedures to help with pain possibly emanating from her lumbar and/or thoracic facets.     At her last visit, we performed the second diagnostic bilateral lumbar facet medial branch blocks to cover the levels of L3-L4, L4-L5, and L5-S1.    She paid very close attention to the relief over the lower lumbar facets and claims that it was very good.    Prior diagnostic lumbar facet medial branch injections afforded greater than 80% relief of pain and significantly increased activities of daily living. This benefit was temporary.       PAST MEDICAL HISTORY:  Current Outpatient Medications on File Prior to Encounter   Medication Sig Dispense Refill   • B Complex Vitamins (VITAMIN B COMPLEX PO) Take  by mouth.     • estradiol (ESTRACE) 1 MG tablet Take 1 mg by mouth Daily.     • fexofenadine (ALLEGRA) 180 MG tablet fexofenadine 180 mg tablet   Take 1 tablet every day by oral route.     • Naproxen  Sodium 220 MG capsule Take 2 tablets by mouth Daily.     • Probiotic Product (PROBIOTIC DAILY PO) Take  by mouth.     • progesterone (PROMETRIUM) 200 MG capsule 200 mg Daily.     • VITAMIN D PO Take  by mouth.     • VITAMIN E PO Take  by mouth.       No current facility-administered medications on file prior to encounter.        Past Medical History:   Diagnosis Date   • Asthma    • Fall 2008   • Low back pain    • Osteoarthritis    • Spondylosis of lumbar region without myelopathy or radiculopathy 7/27/2020   • Thoracic radiculopathy 6/11/2020   • Thoracic spinal stenosis 6/11/2020       SOCIAL HISTORY:  Counseled to avoid tobacco     REVIEW OF SYSTEMS:  No pertinent hematologic, infectious, or constitutional symptoms.  Other review of systems non-contributory     PHYSICAL EXAM:  There were no vitals taken for this visit.  Constitutional: Well-developed well-nourished no acute distress  General: Alert and oriented  Ophtho: EOMI  Airway: Mallampati 2  Cardiac:  Regular rate  Lungs:  Clear to auscultation bilaterally   GI: soft, nontender  Cervical Spine: Noncontributory  Sacroiliac Joints: Noncontributory  Musc: Tenderness palpation over the lower thoracic and lumbar facets  Neuro: Facet loading is positive bilaterally, particularly over the lower 3 lumbar levels        DIAGNOSIS:  Post-Op Diagnosis Codes:  Post-Op Diagnosis Codes:     * Spondylosis of lumbar region without myelopathy or radiculopathy [M47.816]     * History of back surgery [Z98.890]     PLAN:  -Right-sided Lumbar facet medial branch radio frequency ablation to cover the level(s) of L3-L4, L4-L5, and L5-S1.  We will bring her back in a week or 2 to do the left side.   - This patient has received good but temporary relief from previous diagnostic lumbar facet medial branch injections.   - Risks were discussed with the patient, including but not limited to: failure of relief, worsening pain, dysesthesia, sensory loss, radicular pain, radiculopathy,  weakness, tissue, nerve, nerve root or blood vessel damage, bleeding, infection, and abscess formation. Benefits and alternatives were also discussed. No promises were made. The patient voiced understanding.  -  Physical therapy exercises at home should be considered, as tolerated, as prescribed by physical therapy or from the pain clinic handout (given to the patient).  Continuation of these exercises every day, or multiple times per week, even when the patient has good pain relief, was stressed to the patient as a preventative measure to decrease the frequency and severity of future pain episodes.  -  It is recommended that the patient use the least amount of opioid medication possible.     -  Heat and ice to the affected area as tolerated for pain control.  It was discussed that heating pads can cause burns.  -  Daily low impact exercise such as walking or water exercise was recommended to maintain overall health and aid in weight control.   -  Patient was counseled to abstain from tobacco products.  -  Follow up as needed for subsequent injections.  -Of note, she does also have pain in her lower thoracic region and is requesting that we consider future facet treatments to that area.  We have decided to continue to focus on the lumbar area at this time.    Zeus Ingram M.D.  Partner, [facility], [facility], [facility]  Board Certified in Pain Medicine by the American Board of Anesthesiology  Board Certified in Anesthesiology by the American Board of Anesthesiology     Much of this encounter note is an electronic transcription/translation of spoken language to printed text. The electronic translation of spoken language may permit erroneous, or at times, nonsensical words or phrases to be inadvertently transcribed. Although I have reviewed the note for such errors, some may still exist.

## 2020-11-05 NOTE — ANESTHESIA PROCEDURE NOTES
Right-sided lumbar facet medial branch blocks to cover the levels of L3-L4, L4-L5, and L5-S1.    Pre-sedation assessment completed: 11/5/2020 8:33 AM    Patient reassessed immediately prior to procedure    Patient location during procedure: Pain Clinic  Start time: 11/5/2020 8:53 AM  Stop Time: 11/5/2020 9:14 AM    Reason for block: procedure for pain  Performed by  Anesthesiologist: Zeus Ingram MD  Preanesthetic Checklist  Completed: patient identified, site marked, surgical consent, pre-op evaluation, timeout performed, IV checked, risks and benefits discussed and monitors and equipment checked  Prep:  Patient position: prone  Sterile barriers:cap, gloves, mask and sterile barrier  Prep: ChloraPrep  Patient monitoring: blood pressure monitoring, continuous pulse oximetry and EKG  Procedure:  Sedation:yes  Approach:unilateral right  Guidance:fluoroscopy  Location:lumbar  Interspace: to cover the levels of L3-L4, L4-L5, and L5-S1.  Needle type: Radiofrequency ablation needles with 1 cm active tips.  Needle Gauge:20 G  Aspiration:negative  Medications:  Comment:0.5 cc of 2% lidocaine was injected at each level after positive sensory and negative motor stimulation testing was performed.      Post Assessment  Injection Assessment: negative  Patient Tolerance:comfortable throughout block  Complications:no  Additional Notes  The patient was brought into the procedure room and placed in a prone position and was comfortable.  ChloraPrep was used and allowed to dry appropriately.  Sterile towels were placed around the patient's back.  An ipsilateral oblique and caudal tilt was used on the x-ray machine to visualize targets.  Skin was numbed up with 2 cc of 1% lidocaine at each injection site.  Radiofrequency needles were advanced to the groove between the transverse process and the superior articular process.  This was done to cover the right-sided L3-4, L4-5, and L5-S1 level(s) (designated by the SAP and transverse  process).  Once the needle was seated against the superior margin of the transverse process, where it joins the superior articular process of the facet, cannula was walked off the superior margin of the transverse process and advanced 2 mm to position the active tip along the course of the medial branch nerve.  Sensory testing was positive at each location with a stimulation at 50 Hz at less than 0.5 V.  There was no motor stimulation to the affected myotomes of the lower extremities at 2 Hz up to 2 V.  Impedances were 260, 283, 344, and 312 ohms respectively.  Great care was taken not to move the cannulae.  Each level was anesthetized with 0.5 mL of 2% lidocaine, and lesions were created at 80 °C for 90 seconds.  Needles were removed and bandages were placed per nursing.  At no time during the ablation did the patient have any pain or symptoms radiating down the buttocks, groin, hip, or leg.  The patient was taken to recovery by nursing and observed appropriately.    Post-Op Diagnosis Codes:     * Spondylosis of lumbar region without myelopathy or radiculopathy (M47.816)     * History of back surgery (Z98.890)    The patient was observed in recovery with no evidence of new onset neurological deficits or other problems. All questions were answered. The patient was discharged with appropriate instructions.

## 2020-11-18 ENCOUNTER — HOSPITAL ENCOUNTER (OUTPATIENT)
Dept: GENERAL RADIOLOGY | Facility: HOSPITAL | Age: 55
Discharge: HOME | End: 2020-11-18

## 2020-11-18 ENCOUNTER — ANESTHESIA EVENT (OUTPATIENT)
Dept: PAIN MEDICINE | Facility: HOSPITAL | Age: 55
End: 2020-11-18

## 2020-11-18 ENCOUNTER — HOSPITAL ENCOUNTER (OUTPATIENT)
Dept: PAIN MEDICINE | Facility: HOSPITAL | Age: 55
Discharge: HOME | End: 2020-11-18

## 2020-11-18 ENCOUNTER — ANESTHESIA (OUTPATIENT)
Dept: PAIN MEDICINE | Facility: HOSPITAL | Age: 55
End: 2020-11-18

## 2020-11-18 VITALS
RESPIRATION RATE: 16 BRPM | BODY MASS INDEX: 28.7 KG/M2 | DIASTOLIC BLOOD PRESSURE: 86 MMHG | HEIGHT: 63 IN | HEART RATE: 69 BPM | TEMPERATURE: 97.8 F | SYSTOLIC BLOOD PRESSURE: 117 MMHG | OXYGEN SATURATION: 97 % | WEIGHT: 162 LBS

## 2020-11-18 DIAGNOSIS — R52 PAIN: ICD-10-CM

## 2020-11-18 DIAGNOSIS — M47.816 SPONDYLOSIS OF LUMBAR REGION WITHOUT MYELOPATHY OR RADICULOPATHY: ICD-10-CM

## 2020-11-18 PROCEDURE — 25010000002 MIDAZOLAM PER 1 MG: Performed by: ANESTHESIOLOGY

## 2020-11-18 PROCEDURE — 77003 FLUOROGUIDE FOR SPINE INJECT: CPT

## 2020-11-18 PROCEDURE — 25010000002 FENTANYL CITRATE (PF) 100 MCG/2ML SOLUTION: Performed by: ANESTHESIOLOGY

## 2020-11-18 RX ORDER — SODIUM CHLORIDE 0.9 % (FLUSH) 0.9 %
3 SYRINGE (ML) INJECTION EVERY 12 HOURS SCHEDULED
Status: DISCONTINUED | OUTPATIENT
Start: 2020-11-18 | End: 2020-11-19 | Stop reason: HOSPADM

## 2020-11-18 RX ORDER — SODIUM CHLORIDE 0.9 % (FLUSH) 0.9 %
3-10 SYRINGE (ML) INJECTION AS NEEDED
Status: DISCONTINUED | OUTPATIENT
Start: 2020-11-18 | End: 2020-11-19 | Stop reason: HOSPADM

## 2020-11-18 RX ORDER — LIDOCAINE HYDROCHLORIDE 10 MG/ML
1 INJECTION, SOLUTION INFILTRATION; PERINEURAL ONCE
Status: DISCONTINUED | OUTPATIENT
Start: 2020-11-18 | End: 2020-11-19 | Stop reason: HOSPADM

## 2020-11-18 RX ORDER — FENTANYL CITRATE 50 UG/ML
50 INJECTION, SOLUTION INTRAMUSCULAR; INTRAVENOUS AS NEEDED
Status: DISCONTINUED | OUTPATIENT
Start: 2020-11-18 | End: 2020-11-19 | Stop reason: HOSPADM

## 2020-11-18 RX ORDER — LIDOCAINE HYDROCHLORIDE 10 MG/ML
0.5 INJECTION, SOLUTION INFILTRATION; PERINEURAL ONCE AS NEEDED
Status: DISCONTINUED | OUTPATIENT
Start: 2020-11-18 | End: 2020-11-19 | Stop reason: HOSPADM

## 2020-11-18 RX ORDER — MIDAZOLAM HYDROCHLORIDE 1 MG/ML
1 INJECTION INTRAMUSCULAR; INTRAVENOUS AS NEEDED
Status: DISCONTINUED | OUTPATIENT
Start: 2020-11-18 | End: 2020-11-19 | Stop reason: HOSPADM

## 2020-11-18 RX ORDER — LIDOCAINE HYDROCHLORIDE 20 MG/ML
10 INJECTION, SOLUTION INFILTRATION; PERINEURAL ONCE
Status: COMPLETED | OUTPATIENT
Start: 2020-11-18 | End: 2020-11-18

## 2020-11-18 RX ORDER — LIDOCAINE HYDROCHLORIDE 10 MG/ML
10 INJECTION, SOLUTION INFILTRATION; PERINEURAL ONCE
Status: COMPLETED | OUTPATIENT
Start: 2020-11-18 | End: 2020-11-18

## 2020-11-18 RX ADMIN — LIDOCAINE HYDROCHLORIDE 5 ML: 20 INJECTION, SOLUTION EPIDURAL; INFILTRATION; INTRACAUDAL; PERINEURAL at 12:35

## 2020-11-18 RX ADMIN — MIDAZOLAM 2 MG: 1 INJECTION INTRAMUSCULAR; INTRAVENOUS at 12:10

## 2020-11-18 RX ADMIN — FENTANYL CITRATE 50 MCG: 50 INJECTION, SOLUTION INTRAMUSCULAR; INTRAVENOUS at 12:10

## 2020-11-18 RX ADMIN — LIDOCAINE HYDROCHLORIDE 10 ML: 10 INJECTION, SOLUTION EPIDURAL; INFILTRATION; INTRACAUDAL; PERINEURAL at 12:35

## 2020-11-18 NOTE — ANESTHESIA PROCEDURE NOTES
Left-sided lumbar facet medial branch radiofrequency ablation to cover the levels of L3-L4, L4-L5, and L5-S1.    Pre-sedation assessment completed: 11/18/2020 12:05 PM    Patient reassessed immediately prior to procedure    Patient location during procedure: Pain Clinic  Start time: 11/18/2020 12:11 PM  Stop Time: 11/18/2020 12:37 PM    Reason for block: procedure for pain  Performed by  Anesthesiologist: Zeus Ingram MD  Preanesthetic Checklist  Completed: patient identified, site marked, surgical consent, pre-op evaluation, timeout performed, IV checked, risks and benefits discussed and monitors and equipment checked  Prep:  Patient position: prone  Sterile barriers:cap, gloves, mask and sterile barrier  Prep: ChloraPrep  Patient monitoring: blood pressure monitoring, continuous pulse oximetry and EKG  Procedure:  Sedation:yes  Approach:unilateral left  Guidance:fluoroscopy  Location:lumbar  Interspace: to cover the levels of L3-L4, L4-L5, and L5-S1.  Needle type: Radiofrequency ablation needles with 1 cm active tips.  Needle Gauge:20 G  Aspiration:negative  Medications:  Comment:0.5 cc of 2% lidocaine was injected at each level after positive sensory and negative motor stimulation testing was performed.    Post Assessment  Injection Assessment: negative  Patient Tolerance:comfortable throughout block  Complications:no  Additional Notes  The patient was brought into the procedure room and placed in a prone position and was comfortable.  ChloraPrep was used and allowed to dry appropriately.  Sterile towels were placed around the patient's back.  An ipsilateral oblique and caudal tilt was used on the x-ray machine to visualize targets.  Skin was numbed up with 2 cc of 1% lidocaine at each injection site.  Radiofrequency needles were advanced to the groove between the transverse process and the superior articular process.  This was done to cover the left-sided L3-L4, L4-L5, and L5-S1 level(s) (designated by the  SAP and transverse process).  Once the needle was seated against the superior margin of the transverse process, where it joins the superior articular process of the facet, cannula was walked off the superior margin of the transverse process and advanced 2 mm to position the active tip along the course of the medial branch nerve.  Sensory testing was positive at each location with a stimulation at 50 Hz at less than 0.5 V.  There was no motor stimulation to the affected myotomes of the lower extremities at 2 Hz up to 2 V.  Impedances were 272, 330, 373, and 330 ohms respectively.  Great care was taken not to move the cannulae.  Each level was anesthetized with 0.5 mL of 2% lidocaine, and lesions were created at 80 °C for 90 seconds.  Needles were removed and bandages were placed per nursing.  At no time during the ablation did the patient have any pain or symptoms radiating down hthe buttocks, groin, hip, or leg.  The patient was taken to recovery by nursing and observed appropriately.    Post-Op Diagnosis Codes:     * Spondylosis of lumbar region without myelopathy or radiculopathy (M47.816)     * History of lumbar surgery (Z98.890)    The patient was observed in recovery with no evidence of new onset neurological deficits or other problems. All questions were answered. The patient was discharged with appropriate instructions.

## 2020-11-18 NOTE — H&P
CHIEF COMPLAINT:  Low back pain        HISTORY OF PRESENT ILLNESS:  This patient is complaining of low back pain which radiates in a bandlike pattern across her lower thoracic and lumbar spine areas.  It ranges between a 5 and 9 out of 10 on the pain scale.  The pain is significantly decreasing the patient's Activities of Daily Living.       Her post myelogram CT from 3/7/2019 was reviewed.  There is multilevel lumbar degeneration and facet arthropathy.  She has had a prior bilateral L4-L5 lumbar discectomy in December 2019.     This patient had a lumbar epidural steroid injection from a different pain group.  She then presented to me and received a bilateral thoracic transforaminal epidural steroid injection at the T10-T11 level per the request of MARGIE Samaniego.  Unfortunately, neither the epidural performed at the outside facility or the bilateral transforaminal epidural steroid injection performed by me were helpful for this patient.     She had since been back to see Dr. Herring who requested consideration of interventional procedures to help with pain possibly emanating from her lumbar and/or thoracic facets.     We have previously performed two diagnostic bilateral lumbar facet medial branch blocks to cover the levels of L3-L4, L4-L5, and L5-S1.    She paid very close attention to the relief over the lower lumbar facets and claims that it was very good.     Prior diagnostic lumbar facet medial branch injections afforded greater than 80% relief of pain and significantly increased activities of daily living. This benefit was temporary.     At her last visit, she had right-sided radiofrequency ablation.  She is here today for left-sided radiofrequency ablation as planned.      PAST MEDICAL HISTORY:  Current Outpatient Medications on File Prior to Encounter   Medication Sig Dispense Refill   • B Complex Vitamins (VITAMIN B COMPLEX PO) Take  by mouth.     • estradiol (ESTRACE) 1 MG tablet Take 1 mg by mouth  "Daily.     • fexofenadine (ALLEGRA) 180 MG tablet fexofenadine 180 mg tablet   Take 1 tablet every day by oral route.     • Probiotic Product (PROBIOTIC DAILY PO) Take  by mouth.     • progesterone (PROMETRIUM) 200 MG capsule 200 mg Daily.     • VITAMIN D PO Take  by mouth.     • VITAMIN E PO Take  by mouth.     • Naproxen Sodium 220 MG capsule Take 2 tablets by mouth Daily.       No current facility-administered medications on file prior to encounter.        Past Medical History:   Diagnosis Date   • Asthma    • Fall 2008   • Low back pain    • Osteoarthritis    • Spondylosis of lumbar region without myelopathy or radiculopathy 7/27/2020   • Thoracic radiculopathy 6/11/2020   • Thoracic spinal stenosis 6/11/2020       SOCIAL HISTORY:  Counseled to avoid tobacco     REVIEW OF SYSTEMS:  No pertinent hematologic, infectious, or constitutional symptoms.  Other review of systems non-contributory     PHYSICAL EXAM:  /91 (BP Location: Left arm, Patient Position: Sitting)   Pulse 72   Temp 36.6 °C (97.8 °F) (Infrared)   Resp 16   Ht 160 cm (63\")   Wt 73.5 kg (162 lb)   SpO2 95%   BMI 28.70 kg/m²   Constitutional: Well-developed well-nourished no acute distress  General: Alert and oriented  Ophtho: EOMI  Airway: Mallampati 2  Cardiac:  Regular rate  Lungs:  Clear to auscultation bilaterally   GI: soft, nontender  Cervical Spine: Noncontributory  Sacroiliac Joints: Noncontributory  Musc: Tenderness palpation over the lower thoracic and left lumbar facets  Neuro: Facet loading is positive currently on the left as the right side has improved since the radiofrequency ablation.        DIAGNOSIS:  Post-Op Diagnosis Codes:  Post-Op Diagnosis Codes:     * Spondylosis of lumbar region without myelopathy or radiculopathy [M47.816]     * History of lumbar surgery [Z98.890]     PLAN:  -Left-sided Lumbar facet medial branch radio frequency ablation to cover the level(s) of L3-L4, L4-L5, and L5-S1.  We already performed this " on the right side at her last visit.  - This patient has received good but temporary relief from previous diagnostic lumbar facet medial branch injections.   - Risks were discussed with the patient, including but not limited to: failure of relief, worsening pain, dysesthesia, sensory loss, radicular pain, radiculopathy, weakness, tissue, nerve, nerve root or blood vessel damage, bleeding, infection, and abscess formation. Benefits and alternatives were also discussed. No promises were made. The patient voiced understanding.  -  Physical therapy exercises at home should be considered, as tolerated, as prescribed by physical therapy or from the pain clinic handout (given to the patient).  Continuation of these exercises every day, or multiple times per week, even when the patient has good pain relief, was stressed to the patient as a preventative measure to decrease the frequency and severity of future pain episodes.  -  It is recommended that the patient use the least amount of opioid medication possible.     -  Heat and ice to the affected area as tolerated for pain control.  It was discussed that heating pads can cause burns.  -  Daily low impact exercise such as walking or water exercise was recommended to maintain overall health and aid in weight control.   -  Patient was counseled to abstain from tobacco products.  -  Follow up as needed for subsequent injections.  -Of note, she does also have pain in her lower thoracic region and is requesting that we consider future facet treatments to that area.  We have decided to continue to focus on the lumbar area at this time.    Zeus Ingram M.D.  [facility], [facility]  Board Certified in Pain Medicine by the American Board of Anesthesiology  Board Certified in Anesthesiology by the American Board of Anesthesiology     Much of this encounter note is an electronic transcription/translation of spoken language to printed text. The  electronic translation of spoken language may permit erroneous, or at times, nonsensical words or phrases to be inadvertently transcribed. Although I have reviewed the note for such errors, some may still exist.

## 2020-12-24 ENCOUNTER — OFFICE VISIT (OUTPATIENT)
Dept: FAMILY MEDICINE CLINIC | Facility: CLINIC | Age: 55
End: 2020-12-24

## 2020-12-24 VITALS
TEMPERATURE: 98 F | RESPIRATION RATE: 16 BRPM | HEIGHT: 63 IN | BODY MASS INDEX: 27.89 KG/M2 | WEIGHT: 157.4 LBS | HEART RATE: 80 BPM | DIASTOLIC BLOOD PRESSURE: 80 MMHG | SYSTOLIC BLOOD PRESSURE: 138 MMHG | OXYGEN SATURATION: 98 %

## 2020-12-24 DIAGNOSIS — Z87.898 HISTORY OF WEIGHT LOSS: ICD-10-CM

## 2020-12-24 DIAGNOSIS — R63.4 WEIGHT LOSS, ABNORMAL: Primary | ICD-10-CM

## 2020-12-24 PROCEDURE — 99214 OFFICE O/P EST MOD 30 MIN: CPT | Performed by: INTERNAL MEDICINE

## 2020-12-25 LAB — HCV AB S/CO SERPL IA: <0.1 S/CO RATIO (ref 0–0.9)

## 2021-01-01 NOTE — PROGRESS NOTES
12/24/2020    CC: Back Pain (mid and lower back pain)  .        HPI  Back Pain  This is a chronic problem. The current episode started more than 1 year ago. The problem occurs every several days. The problem has been gradually improving since onset. The pain is present in the gluteal and lumbar spine. The quality of the pain is described as aching. The pain does not radiate. The pain is at a severity of 4/10. The symptoms are aggravated by bending and coughing.        Juan José Sena is a 55 y.o. female.      The following portions of the patient's history were reviewed and updated as appropriate: allergies, current medications, past family history, past medical history, past social history, past surgical history and problem list.    Problem List  Patient Active Problem List   Diagnosis   • DDD (degenerative disc disease), lumbar   • Lumbar radiculopathy   • Thoracic spinal stenosis   • Thoracic radiculopathy   • Spondylosis of lumbar region without myelopathy or radiculopathy       Past Medical History  Past Medical History:   Diagnosis Date   • Asthma    • Fall 2008   • Low back pain    • Osteoarthritis    • Spondylosis of lumbar region without myelopathy or radiculopathy 7/27/2020   • Thoracic radiculopathy 6/11/2020   • Thoracic spinal stenosis 6/11/2020       Surgical History  Past Surgical History:   Procedure Laterality Date   • ANKLE SURGERY Left    • ENDOMETRIAL ABLATION     • EPIDURAL BLOCK     • GANGLION CYST EXCISION      2012, 2014   • NASAL SEPTUM SURGERY      2000's    • TONSILLECTOMY         Family History  Family History   Problem Relation Age of Onset   • Cancer Father    • Down syndrome Son        Social History  Social History    Tobacco Use      Smoking status: Never Smoker      Smokeless tobacco: Never Used       Is the Patient a current tobacco user? No    Allergies  Allergies   Allergen Reactions   • Phenobarbital Other (See Comments)   • Tetanus Toxoid Other (See Comments)        Current Medications    Current Outpatient Medications:   •  B Complex Vitamins (VITAMIN B COMPLEX PO), Take  by mouth., Disp: , Rfl:   •  estradiol (ESTRACE) 1 MG tablet, Take 1 mg by mouth Daily., Disp: , Rfl:   •  fexofenadine (ALLEGRA) 180 MG tablet, fexofenadine 180 mg tablet  Take 1 tablet every day by oral route., Disp: , Rfl:   •  Naproxen Sodium 220 MG capsule, Take 2 tablets by mouth Daily., Disp: , Rfl:   •  Probiotic Product (PROBIOTIC DAILY PO), Take  by mouth., Disp: , Rfl:   •  progesterone (PROMETRIUM) 200 MG capsule, 200 mg Daily., Disp: , Rfl:   •  VITAMIN D PO, Take  by mouth., Disp: , Rfl:   •  VITAMIN E PO, Take  by mouth., Disp: , Rfl:      Review of System  Review of Systems   Constitutional: Negative.    HENT: Negative.    Eyes: Negative.    Respiratory: Negative.    Cardiovascular: Negative.    Gastrointestinal: Negative.    Endocrine: Negative.    Musculoskeletal: Positive for back pain.   Skin: Negative.    Allergic/Immunologic: Negative.    Hematological: Negative.      I have reviewed and confirmed the accuracy of the ROS as documented by the MA/LPN/RN Jacoby Torres MD    Vitals:    12/24/20 1106   BP: 138/80   Pulse: 80   Resp: 16   Temp: 98 °F (36.7 °C)   SpO2: 98%     Body mass index is 27.88 kg/m².    Objective     Office Visit on 12/24/2020   Component Date Value Ref Range Status   • Hep C Virus Ab 12/24/2020 <0.1  0.0 - 0.9 s/co ratio Final    Comment:                                   Negative:     < 0.8                               Indeterminate: 0.8 - 0.9                                    Positive:     > 0.9   The CDC recommends that a positive HCV antibody result   be followed up with a HCV Nucleic Acid Amplification   test (159469).         Physical Exam  Physical Exam  Constitutional:       Appearance: Normal appearance.   HENT:      Head: Normocephalic and atraumatic.      Right Ear: External ear normal.      Left Ear: External ear normal.   Eyes:      Extraocular  Movements: Extraocular movements intact.   Neck:      Musculoskeletal: Normal range of motion and neck supple.   Cardiovascular:      Rate and Rhythm: Normal rate and regular rhythm.      Pulses: Normal pulses.      Heart sounds: Normal heart sounds.   Pulmonary:      Effort: Pulmonary effort is normal.      Breath sounds: Normal breath sounds.   Abdominal:      General: Abdomen is flat.      Palpations: Abdomen is soft.   Musculoskeletal:         General: Tenderness present.      Right lower leg: No edema.      Left lower leg: Edema present.      Comments: Rotation extension and flexion at the waist or inhibited secondary to back pain.   Neurological:      Mental Status: She is alert.         Assessment/Plan      This patient presents today to me having been lost to follow-up for 2 years.  In the interim of visits she's lost 60 pounds.  This is intentional and after recommendation from neurosurgery.  She has had neurosurgery done by Dr. Herman at [facility] neurosurgery.  In spite of that she continued to have back pain and was recently referred to pain management where she is receiving radiofrequency ablation.  She relates that she lost the weight by doing a combination of fasting plus one other diet.  She has not used any medications for weight loss.  She's also been bothered by some mild depression her son passed away about a year or so ago at her house.  She is bringing up her new grandson.  She was given medication to help her sleep by GYN.    She relates the back pain was very good for relieving the extreme pain she was having in her hip and lower legs but she still has middle back pain.    We'll evaluate her thyroid for hypothyroidism as a possible cause of her weight loss.    We'll see her back for more complete physical examination in the next few months.      Diagnoses and all orders for this visit:    1. Weight loss, abnormal (Primary)  -     T4, free  -     TSH  -     Comprehensive metabolic panel  -     CBC  & Differential  -     Hepatitis C antibody    2. History of weight loss             Jacoby Torres MD  12/24/2020

## 2021-01-15 ENCOUNTER — OFFICE VISIT (OUTPATIENT)
Dept: FAMILY MEDICINE CLINIC | Facility: CLINIC | Age: 56
End: 2021-01-15

## 2021-01-15 VITALS
TEMPERATURE: 97.6 F | RESPIRATION RATE: 16 BRPM | BODY MASS INDEX: 27.32 KG/M2 | WEIGHT: 154.2 LBS | OXYGEN SATURATION: 99 % | DIASTOLIC BLOOD PRESSURE: 82 MMHG | HEART RATE: 79 BPM | HEIGHT: 63 IN | SYSTOLIC BLOOD PRESSURE: 120 MMHG

## 2021-01-15 DIAGNOSIS — R63.4 WEIGHT LOSS: Primary | ICD-10-CM

## 2021-01-15 DIAGNOSIS — E05.90 HYPERTHYROIDISM: ICD-10-CM

## 2021-01-15 DIAGNOSIS — Z00.00 ANNUAL VISIT FOR GENERAL ADULT MEDICAL EXAMINATION WITHOUT ABNORMAL FINDINGS: ICD-10-CM

## 2021-01-15 PROCEDURE — 99396 PREV VISIT EST AGE 40-64: CPT | Performed by: INTERNAL MEDICINE

## 2021-01-15 RX ORDER — ASCORBIC ACID 125 MG
TABLET,CHEWABLE ORAL
COMMUNITY

## 2021-01-16 LAB
ALBUMIN SERPL-MCNC: 4.2 G/DL (ref 3.5–5.2)
ALBUMIN/GLOB SERPL: 1.9 G/DL
ALP SERPL-CCNC: 84 U/L (ref 39–117)
ALT SERPL-CCNC: 9 U/L (ref 1–33)
AST SERPL-CCNC: 19 U/L (ref 1–32)
BASOPHILS # BLD AUTO: 0.06 10*3/MM3 (ref 0–0.2)
BASOPHILS NFR BLD AUTO: 0.7 % (ref 0–1.5)
BILIRUB SERPL-MCNC: 0.3 MG/DL (ref 0–1.2)
BUN SERPL-MCNC: 13 MG/DL (ref 6–20)
BUN/CREAT SERPL: 21.7 (ref 7–25)
CALCIUM SERPL-MCNC: 9.7 MG/DL (ref 8.6–10.5)
CHLORIDE SERPL-SCNC: 105 MMOL/L (ref 98–107)
CHOLEST SERPL-MCNC: 239 MG/DL (ref 0–200)
CHOLEST/HDLC SERPL: 3.62 {RATIO}
CO2 SERPL-SCNC: 23.9 MMOL/L (ref 22–29)
CREAT SERPL-MCNC: 0.6 MG/DL (ref 0.57–1)
EOSINOPHIL # BLD AUTO: 0.16 10*3/MM3 (ref 0–0.4)
EOSINOPHIL NFR BLD AUTO: 1.9 % (ref 0.3–6.2)
ERYTHROCYTE [DISTWIDTH] IN BLOOD BY AUTOMATED COUNT: 13.2 % (ref 12.3–15.4)
GLOBULIN SER CALC-MCNC: 2.2 GM/DL
GLUCOSE SERPL-MCNC: 93 MG/DL (ref 65–99)
HCT VFR BLD AUTO: 39.4 % (ref 34–46.6)
HDLC SERPL-MCNC: 66 MG/DL (ref 40–60)
HGB BLD-MCNC: 13.3 G/DL (ref 12–15.9)
IMM GRANULOCYTES # BLD AUTO: 0.02 10*3/MM3 (ref 0–0.05)
IMM GRANULOCYTES NFR BLD AUTO: 0.2 % (ref 0–0.5)
LDLC SERPL CALC-MCNC: 162 MG/DL (ref 0–100)
LYMPHOCYTES # BLD AUTO: 2.74 10*3/MM3 (ref 0.7–3.1)
LYMPHOCYTES NFR BLD AUTO: 32.2 % (ref 19.6–45.3)
Lab: NORMAL
MCH RBC QN AUTO: 29.8 PG (ref 26.6–33)
MCHC RBC AUTO-ENTMCNC: 33.8 G/DL (ref 31.5–35.7)
MCV RBC AUTO: 88.3 FL (ref 79–97)
MONOCYTES # BLD AUTO: 0.65 10*3/MM3 (ref 0.1–0.9)
MONOCYTES NFR BLD AUTO: 7.6 % (ref 5–12)
NEUTROPHILS # BLD AUTO: 4.89 10*3/MM3 (ref 1.7–7)
NEUTROPHILS NFR BLD AUTO: 57.4 % (ref 42.7–76)
NRBC BLD AUTO-RTO: 0 /100 WBC (ref 0–0.2)
PLATELET # BLD AUTO: 307 10*3/MM3 (ref 140–450)
POTASSIUM SERPL-SCNC: 4.4 MMOL/L (ref 3.5–5.2)
PROT SERPL-MCNC: 6.4 G/DL (ref 6–8.5)
RBC # BLD AUTO: 4.46 10*6/MM3 (ref 3.77–5.28)
SODIUM SERPL-SCNC: 139 MMOL/L (ref 136–145)
T4 FREE SERPL-MCNC: 1.09 NG/DL (ref 0.93–1.7)
TRIGL SERPL-MCNC: 66 MG/DL (ref 0–150)
TSH SERPL DL<=0.005 MIU/L-ACNC: 2.9 UIU/ML (ref 0.27–4.2)
VLDLC SERPL CALC-MCNC: 11 MG/DL (ref 5–40)
WBC # BLD AUTO: 8.52 10*3/MM3 (ref 3.4–10.8)

## 2021-01-18 NOTE — PROGRESS NOTES
01/15/2021    CC: Annual Exam (...no other issues)  .        HPI  History of Present Illness     Subjective   Tiana Sena is a 55 y.o. female.      The following portions of the patient's history were reviewed and updated as appropriate: allergies, current medications, past family history, past medical history, past social history, past surgical history and problem list.    Problem List  Patient Active Problem List   Diagnosis   • DDD (degenerative disc disease), lumbar   • Lumbar radiculopathy   • Thoracic spinal stenosis   • Thoracic radiculopathy   • Spondylosis of lumbar region without myelopathy or radiculopathy       Past Medical History  Past Medical History:   Diagnosis Date   • Asthma    • Fall 2008   • Low back pain    • Osteoarthritis    • Spondylosis of lumbar region without myelopathy or radiculopathy 7/27/2020   • Thoracic radiculopathy 6/11/2020   • Thoracic spinal stenosis 6/11/2020       Surgical History  Past Surgical History:   Procedure Laterality Date   • ANKLE SURGERY Left    • ENDOMETRIAL ABLATION     • EPIDURAL BLOCK     • GANGLION CYST EXCISION      2012, 2014   • NASAL SEPTUM SURGERY      2000's    • TONSILLECTOMY         Family History  Family History   Problem Relation Age of Onset   • Cancer Father    • Down syndrome Son        Social History  Social History    Tobacco Use      Smoking status: Never Smoker      Smokeless tobacco: Never Used       Is the Patient a current tobacco user? No    Allergies  Allergies   Allergen Reactions   • Phenobarbital Other (See Comments)   • Tetanus Toxoid Other (See Comments)       Current Medications    Current Outpatient Medications:   •  B Complex Vitamins (VITAMIN B COMPLEX PO), Take  by mouth., Disp: , Rfl:   •  estradiol (ESTRACE) 1 MG tablet, Take 1 mg by mouth Daily., Disp: , Rfl:   •  fexofenadine (ALLEGRA) 180 MG tablet, fexofenadine 180 mg tablet  Take 1 tablet every day by oral route., Disp: , Rfl:   •  Magnesium 100 MG capsule, Take  by  mouth., Disp: , Rfl:   •  Menaquinone-7 (Vitamin K2) 100 MCG capsule, Take  by mouth., Disp: , Rfl:   •  Naproxen Sodium 220 MG capsule, Take 2 tablets by mouth Daily., Disp: , Rfl:   •  Probiotic Product (PROBIOTIC DAILY PO), Take  by mouth., Disp: , Rfl:   •  progesterone (PROMETRIUM) 200 MG capsule, 200 mg Daily., Disp: , Rfl:   •  VITAMIN D PO, Take  by mouth., Disp: , Rfl:   •  VITAMIN E PO, Take  by mouth., Disp: , Rfl:      Review of System  Review of Systems   Constitutional: Negative.    HENT: Negative.    Eyes: Negative.    Respiratory: Negative.    Cardiovascular: Negative.    Gastrointestinal: Negative.    Musculoskeletal: Negative.    Skin: Negative.    Allergic/Immunologic: Negative.    Neurological: Negative.    Psychiatric/Behavioral: Negative.      I have reviewed and confirmed the accuracy of the ROS as documented by the MA/LPN/RN Jacoby Torres MD    Vitals:    01/15/21 1048   BP: 120/82   Pulse: 79   Resp: 16   Temp: 97.6 °F (36.4 °C)   SpO2: 99%     Body mass index is 27.32 kg/m².    Objective     Physical Exam  Physical Exam  Vitals signs and nursing note reviewed.   Constitutional:       Appearance: She is well-developed.   HENT:      Head: Normocephalic and atraumatic.   Eyes:      Extraocular Movements: Extraocular movements intact.      Conjunctiva/sclera: Conjunctivae normal.   Neck:      Musculoskeletal: Normal range of motion and neck supple.   Cardiovascular:      Rate and Rhythm: Normal rate and regular rhythm.      Heart sounds: Normal heart sounds.   Pulmonary:      Effort: Pulmonary effort is normal.      Breath sounds: Normal breath sounds.   Abdominal:      General: Bowel sounds are normal.      Palpations: Abdomen is soft.   Musculoskeletal: Normal range of motion.   Skin:     General: Skin is warm and dry.   Neurological:      Mental Status: She is alert and oriented to person, place, and time.   Psychiatric:         Behavior: Behavior normal.         Assessment/Plan     This  patient presents for physical examination.  She relates that she recently had her gynecologic exam done with Dr. Wick.    Regarding anticipatory guidance: We discussed with the patient importance of regular physical activity.  Like to see her have at least 30 minutes of physical activity most days per week.  We also discussed the importance of low-sodium intake as a way to maintain good blood pressure control.  A low-cholesterol diet was also discussed.    Diagnoses and all orders for this visit:    1. Weight loss (Primary)  -     Comprehensive metabolic panel  -     Lipid Panel w/ Chol/HDL Ratio  -     CBC w AUTO Differential    2. Hyperthyroidism  -     T4, free  -     TSH    Other orders  -     Please Note             Jacoby Torres MD  01/15/2021

## 2021-03-15 ENCOUNTER — HOSPITAL ENCOUNTER (OUTPATIENT)
Dept: PAIN MEDICINE | Facility: HOSPITAL | Age: 56
Discharge: HOME | End: 2021-03-15
Admitting: ANESTHESIOLOGY

## 2021-03-15 ENCOUNTER — ANESTHESIA (OUTPATIENT)
Dept: PAIN MEDICINE | Facility: HOSPITAL | Age: 56
End: 2021-03-15

## 2021-03-15 ENCOUNTER — ANESTHESIA EVENT (OUTPATIENT)
Dept: PAIN MEDICINE | Facility: HOSPITAL | Age: 56
End: 2021-03-15

## 2021-03-15 PROCEDURE — G0463 HOSPITAL OUTPT CLINIC VISIT: HCPCS

## 2021-03-15 NOTE — H&P
CHIEF COMPLAINT:  Low back pain        HISTORY OF PRESENT ILLNESS:  This patient is complaining of pain throughout the entirety of her back radiating laterally at all levels as well as pain in her bilateral hips radiating down her left greater than right leg as well as some abdominal pain.  She states that the pain is worsened by any activity, exercise, lifting, lying down, sitting, standing, straining, twisting, walking, and states that anything she does causes her to have pain.  It ranges between a 6 and 9 out of 10 on the pain scale.  The pain is significantly decreasing the patient's Activities of Daily Living.       She has had a prior bilateral L4-L5 lumbar discectomy in December 2019.     This patient had a lumbar epidural steroid injection from a different pain group.  She then presented to me and received a bilateral thoracic transforaminal epidural steroid injection at the T10-T11 level per the request of MARGIE Samaniego.  Unfortunately, neither the epidural performed at the outside facility or the bilateral transforaminal epidural steroid injection performed by me were helpful for this patient.     She had since been back to see Dr. Herring who requested consideration of interventional procedures to help with pain possibly emanating from her lumbar and/or thoracic facets.  She had successful but temporary diagnostic lumbar facet medial branch blocks which eventually led to radiofrequency ablation of the L3-L4, L4-L5, and L5-S1 levels bilaterally.  She states that this was very helpful for the bandlike pattern of pain across her lower back, but that the relief has already faded.  These were performed in November 2020.        PAST MEDICAL HISTORY:  Current Outpatient Medications on File Prior to Encounter   Medication Sig Dispense Refill   • B Complex Vitamins (VITAMIN B COMPLEX PO) Take  by mouth.     • estradiol (ESTRACE) 1 MG tablet Take 1 mg by mouth Daily.     • fexofenadine (ALLEGRA) 180 MG tablet  fexofenadine 180 mg tablet   Take 1 tablet every day by oral route.     • Magnesium 100 MG capsule Take  by mouth.     • Menaquinone-7 (Vitamin K2) 100 MCG capsule Take  by mouth.     • Naproxen Sodium 220 MG capsule Take 2 tablets by mouth Daily.     • Probiotic Product (PROBIOTIC DAILY PO) Take  by mouth.     • progesterone (PROMETRIUM) 200 MG capsule 200 mg Daily.     • VITAMIN D PO Take  by mouth.     • VITAMIN E PO Take  by mouth.       No current facility-administered medications on file prior to encounter.       Past Medical History:   Diagnosis Date   • Asthma    • Fall 2008   • Low back pain    • Osteoarthritis    • Spondylosis of lumbar region without myelopathy or radiculopathy 7/27/2020   • Thoracic radiculopathy 6/11/2020   • Thoracic spinal stenosis 6/11/2020       SOCIAL HISTORY:  Counseled to avoid tobacco     REVIEW OF SYSTEMS:  No pertinent hematologic, infectious, or constitutional symptoms.  Other review of systems non-contributory     PHYSICAL EXAM:  There were no vitals taken for this visit.  Constitutional: Well-developed well-nourished no acute distress  General: Alert and oriented  Neuromuscular: Tenderness to palpation throughout her entire spine and pain with just about any movement       DIAGNOSIS:  Post-Op Diagnosis Codes:  Post-Op Diagnosis Codes:     * Spondylosis of lumbar region without myelopathy or radiculopathy [M47.816]     * History of lumbar surgery [Z98.890]     PLAN:  -I am happy that previous lumbar facet medial branch radiofrequency ablation was helpful for her but am somewhat discouraged because it did not last very long.  Unfortunately, she has pain now in so many areas and with just about any movement.  I do not think that I will be able to pinpoint an area of her body for which an interventional pain management procedure will likely be helpful.  -She has stated that she will discuss her condition with her primary care and her spine surgeon for possible further  diagnostic work-up.  -  Physical therapy exercises at home should be considered, as tolerated.  She has been to [facility] to physical therapy in the past and received multiple exercises specifically focusing on core strengthening.  She has not been able to return because of the pandemic.  I have recommended that she consider returning to physical therapy.  She states that she will do this.  -It is possible that this patient would benefit from some sort of antidepressant medication which may have a secondary benefit of decreasing her pain.  She did have a loss in the family recently.  The patient states that she would discuss this with her primary care physician and her therapist.  -  It is recommended that the patient use the least amount of opioid medication possible.     -  Heat and ice to the affected area as tolerated for pain control.  It was discussed that heating pads can cause burns.  -  Daily low impact exercise such as walking or water exercise was recommended to maintain overall health and aid in weight control.   -  Patient was counseled to abstain from tobacco products.  -  Follow up as needed for subsequent injections.  -Unfortunately, I do not have any other recommendations at this time.  I will be happy to see this patient for future consultation should the need arise.  -I spent approximately 33 total minutes of billable time on this patient today.    Zeus Ingram M.D.  Cherelle Kumar, [facility], [facility]  Board Certified in Pain Medicine by the American Board of Anesthesiology  Board Certified in Anesthesiology by the American Board of Anesthesiology     Much of this encounter note is an electronic transcription/translation of spoken language to printed text. The electronic translation of spoken language may permit erroneous, or at times, nonsensical words or phrases to be inadvertently transcribed. Although I have reviewed the note for such errors, some may still exist.

## 2021-03-22 ENCOUNTER — OFFICE VISIT (OUTPATIENT)
Dept: FAMILY MEDICINE CLINIC | Facility: CLINIC | Age: 56
End: 2021-03-22

## 2021-03-22 VITALS
HEIGHT: 63 IN | WEIGHT: 152.6 LBS | HEART RATE: 74 BPM | SYSTOLIC BLOOD PRESSURE: 130 MMHG | DIASTOLIC BLOOD PRESSURE: 80 MMHG | RESPIRATION RATE: 16 BRPM | TEMPERATURE: 97.8 F | BODY MASS INDEX: 27.04 KG/M2 | OXYGEN SATURATION: 98 %

## 2021-03-22 DIAGNOSIS — M47.816 SPONDYLOSIS OF LUMBAR REGION WITHOUT MYELOPATHY OR RADICULOPATHY: Chronic | ICD-10-CM

## 2021-03-22 DIAGNOSIS — M47.816 OSTEOARTHRITIS OF LUMBAR SPINE, UNSPECIFIED SPINAL OSTEOARTHRITIS COMPLICATION STATUS: Primary | Chronic | ICD-10-CM

## 2021-03-22 DIAGNOSIS — M48.04 THORACIC SPINAL STENOSIS: Chronic | ICD-10-CM

## 2021-03-22 DIAGNOSIS — Z98.890 HISTORY OF LUMBAR SURGERY: ICD-10-CM

## 2021-03-22 DIAGNOSIS — M54.14 THORACIC RADICULOPATHY: Chronic | ICD-10-CM

## 2021-03-22 PROCEDURE — 99214 OFFICE O/P EST MOD 30 MIN: CPT | Performed by: INTERNAL MEDICINE

## 2021-03-30 ENCOUNTER — BULK ORDERING (OUTPATIENT)
Dept: CASE MANAGEMENT | Facility: OTHER | Age: 56
End: 2021-03-30

## 2021-03-30 DIAGNOSIS — Z23 IMMUNIZATION DUE: ICD-10-CM

## 2021-03-31 ENCOUNTER — TELEPHONE (OUTPATIENT)
Dept: FAMILY MEDICINE CLINIC | Facility: CLINIC | Age: 56
End: 2021-03-31

## 2021-04-05 ENCOUNTER — OFFICE VISIT (OUTPATIENT)
Dept: FAMILY MEDICINE CLINIC | Facility: CLINIC | Age: 56
End: 2021-04-05

## 2021-04-05 VITALS
TEMPERATURE: 97.3 F | BODY MASS INDEX: 26.93 KG/M2 | DIASTOLIC BLOOD PRESSURE: 80 MMHG | RESPIRATION RATE: 16 BRPM | OXYGEN SATURATION: 99 % | WEIGHT: 152 LBS | HEART RATE: 73 BPM | SYSTOLIC BLOOD PRESSURE: 110 MMHG | HEIGHT: 63 IN

## 2021-04-05 DIAGNOSIS — M54.16 LUMBAR RADICULOPATHY: Primary | ICD-10-CM

## 2021-04-05 DIAGNOSIS — F33.9 EPISODE OF RECURRENT MAJOR DEPRESSIVE DISORDER, UNSPECIFIED DEPRESSION EPISODE SEVERITY (HCC): ICD-10-CM

## 2021-04-05 PROCEDURE — 99214 OFFICE O/P EST MOD 30 MIN: CPT | Performed by: INTERNAL MEDICINE

## 2021-04-16 ENCOUNTER — TELEPHONE (OUTPATIENT)
Dept: FAMILY MEDICINE CLINIC | Facility: CLINIC | Age: 56
End: 2021-04-16

## 2021-04-16 NOTE — TELEPHONE ENCOUNTER
----- Message from Lenora Leos MA sent at 4/16/2021 12:50 PM EDT -----  Regarding: FW: Non-Urgent Medical Question  Contact: [phone]    ----- Message -----  From: Tiana Sena  Sent: 4/16/2021   8:46 AM EDT  To: Samm Mahmood [facility]  Subject: Non-Urgent Medical Question                      Hello,   I have some JUAN JOSE's scheduled for April 23, 2021 at [facility] on [facility]. Dr. Melquiades Herring office put the orders in. (Actually Yg Serrano put the orders in) but I contacted their office to request that my entire tailbone be included in the scans because that is a great source of pain for me in addition to my lower back and my middle back and ribs. Their office responded to my request by telling me that they do not treat the coccyx bone area so there would not have a valid reason to request scans in that area as well. Is there any way you can request that that area be scanned as well? I really feel like it is part of the problem and if you look at my records I did come to your office because of a fall on my tailbone approximately 20 years ago. I believe it was determined (can't remember if any scans were taken or not) that it was a bruised coccyx bone. It has been a source of pain ever since it happened all those years ago. Since Dr. Herring office does not treat the coccyx area I would agree to go to another specialist for treatment if the scans reveal a problem with my coccyx area.   Thank you, Sue Sena

## 2021-04-20 ENCOUNTER — TELEPHONE (OUTPATIENT)
Dept: FAMILY MEDICINE CLINIC | Facility: CLINIC | Age: 56
End: 2021-04-20

## 2021-04-20 NOTE — TELEPHONE ENCOUNTER
----- Message from Tiana Sena sent at 4/20/2021 11:26 AM EDT -----  Regarding: RE: MRI  Contact: [phone]  Concerning the MRI of my tailbone: If my back surgeons office won't request it because they don't treat it, and Dr. Torres won't request one then who will? I am not trying to be sarcastic or funny....my gynecologists? Because that is the only other type of doctor I see. So that is a serious question. Is there a specialist that he can refer me to for the coccyx bone pain? Thank you, Sue    ----- Message -----  From: SEGUNDO HE  Sent: 4/20/21 8:55 AM  To: Tiana Sena  Subject: MRI    Per Dr. Torres, we have to stick to the MRI request as written

## 2021-04-23 NOTE — PROGRESS NOTES
04/05/2021 Answers for HPI/ROS submitted by the patient on 3/29/2021  What is the primary reason for your visit?: Back Pain        CC: Depression (PHQ9 (? about getting PT orders...see last phone message))  .        HPI  Depression  Visit Type: follow-up  Patient is not experiencing: weight loss.    Back Pain  This is a chronic problem. The current episode started more than 1 year ago. The problem occurs constantly. The problem has been gradually worsening since onset. The pain is present in the gluteal, lumbar spine, sacro-iliac and thoracic spine. The quality of the pain is described as aching, burning and shooting. The pain radiates to the left thigh. The pain is at a severity of 6/10. The pain is worse during the day. The symptoms are aggravated by bending, position, sitting, standing, stress and twisting. Stiffness is present in the morning, at night and all day. Associated symptoms include leg pain and weakness. Pertinent negatives include no abdominal pain, bladder incontinence, bowel incontinence, chest pain, dysuria, fever, headaches, numbness, paresis, paresthesias, pelvic pain, perianal numbness, tingling or weight loss. Risk factors include menopause.        Juan José Sena is a 55 y.o. female.      The following portions of the patient's history were reviewed and updated as appropriate: allergies, current medications, past family history, past medical history, past social history, past surgical history and problem list.    Problem List  Patient Active Problem List   Diagnosis   • DDD (degenerative disc disease), lumbar   • Lumbar radiculopathy   • Thoracic spinal stenosis   • Thoracic radiculopathy   • Spondylosis of lumbar region without myelopathy or radiculopathy       Past Medical History  Past Medical History:   Diagnosis Date   • Asthma    • Fall 2008   • Low back pain    • Osteoarthritis    • Spondylosis of lumbar region without myelopathy or radiculopathy 7/27/2020   • Thoracic  radiculopathy 6/11/2020   • Thoracic spinal stenosis 6/11/2020       Surgical History  Past Surgical History:   Procedure Laterality Date   • ANKLE SURGERY Left    • ENDOMETRIAL ABLATION     • EPIDURAL BLOCK     • GANGLION CYST EXCISION      2012, 2014   • NASAL SEPTUM SURGERY      2000's    • TONSILLECTOMY         Family History  Family History   Problem Relation Age of Onset   • Cancer Father    • Down syndrome Son        Social History  Social History    Tobacco Use      Smoking status: Never Smoker      Smokeless tobacco: Never Used       Is the Patient a current tobacco user? No    Allergies  Allergies   Allergen Reactions   • Phenobarbital Other (See Comments)   • Tetanus Toxoid Other (See Comments)       Current Medications    Current Outpatient Medications:   •  B Complex Vitamins (VITAMIN B COMPLEX PO), Take  by mouth., Disp: , Rfl:   •  estradiol (ESTRACE) 1 MG tablet, Take 1 mg by mouth Daily., Disp: , Rfl:   •  fexofenadine (ALLEGRA) 180 MG tablet, fexofenadine 180 mg tablet  Take 1 tablet every day by oral route., Disp: , Rfl:   •  Magnesium 100 MG capsule, Take  by mouth., Disp: , Rfl:   •  Menaquinone-7 (Vitamin K2) 100 MCG capsule, Take  by mouth., Disp: , Rfl:   •  Probiotic Product (PROBIOTIC DAILY PO), Take  by mouth., Disp: , Rfl:   •  progesterone (PROMETRIUM) 200 MG capsule, 200 mg Daily., Disp: , Rfl:   •  VITAMIN D PO, Take  by mouth., Disp: , Rfl:   •  VITAMIN E PO, Take  by mouth., Disp: , Rfl:      Review of System  Review of Systems   Constitutional: Negative for fever and unexpected weight loss.   Cardiovascular: Negative for chest pain.   Gastrointestinal: Negative for abdominal pain and bowel incontinence.   Genitourinary: Negative for dysuria, pelvic pain and urinary incontinence.   Musculoskeletal: Positive for back pain.   Neurological: Positive for weakness. Negative for tingling, numbness and paresthesias.     I have reviewed and confirmed the accuracy of the ROS as documented by  the MA/LPN/RN Jacoby Torres MD    Vitals:    04/05/21 1518   BP: 110/80   Pulse: 73   Resp: 16   Temp: 97.3 °F (36.3 °C)   SpO2: 99%     Body mass index is 26.93 kg/m².    Objective     Physical Exam  Physical Exam  Cardiovascular:      Rate and Rhythm: Normal rate and regular rhythm.      Pulses: Normal pulses.      Heart sounds: Normal heart sounds.   Pulmonary:      Effort: Pulmonary effort is normal.      Breath sounds: Normal breath sounds.   Musculoskeletal:      Cervical back: Normal range of motion and neck supple.   Neurological:      General: No focal deficit present.      Mental Status: She is oriented to person, place, and time. Mental status is at baseline.      Motor: No weakness.   Psychiatric:      Comments: Her affect is depressed         Assessment/Plan      This 55-year-old long-term patient of mine presents today with the complaint of continued back pain.  She has been followed by pain management for at least the past 2 years.  And had back surgery done approximately 2 years ago as well.  She relates that her pain management doctor does not want to see her in a more and she states that he wants her to be wants me to send her to physical therapy.  In reviewing the chart and talking to the clinician we find that the patient is not felt by pain management to be responding to treatment.  Apparently when there is an injection given for the lumbar component within a short period time she complains of thoracic pain.  No stimulators been used in the patient continues to have pain in areas all over the body.  It is felt that there may be an element of depression involved and I would agree.  She recently lost her son to an overdose at her home.    PH Q9 test was administered.The Patient's patient's score was 5/27.    We have requested pain management prescribed physical therapy for the patient who would like to go to court on [facility].  No controlled substance medications have been mentioned.   We'll see the patient back if the combination of physical therapy.    At this point patient has had surgery and pain management extensively with no improvement in her back pain. Neuro stimulation has also not been helpful in decreasing her pain.  It is noted that her back pain is felt to involve multiple sites on different occasions.            Diagnoses and all orders for this visit:    1. Lumbar radiculopathy (Primary)    2. Episode of recurrent major depressive disorder, unspecified depression episode severity (CMS/HCC)      Plan:  1.)  We'll see the patient back in the next several weeks following physical therapy  #2.)  I'll consider referral for more comprehensive evaluation of depression.       Jacoby Torres MD  04/05/2021

## 2022-05-10 ENCOUNTER — TELEPHONE (OUTPATIENT)
Dept: FAMILY MEDICINE CLINIC | Facility: CLINIC | Age: 57
End: 2022-05-10

## 2022-07-12 ENCOUNTER — TELEPHONE (OUTPATIENT)
Dept: FAMILY MEDICINE CLINIC | Facility: CLINIC | Age: 57
End: 2022-07-12

## 2023-01-20 ENCOUNTER — OFFICE VISIT (OUTPATIENT)
Dept: FAMILY MEDICINE CLINIC | Facility: CLINIC | Age: 58
End: 2023-01-20
Payer: MEDICARE

## 2023-01-20 VITALS
DIASTOLIC BLOOD PRESSURE: 68 MMHG | OXYGEN SATURATION: 99 % | HEART RATE: 69 BPM | WEIGHT: 174 LBS | TEMPERATURE: 98.1 F | HEIGHT: 63 IN | BODY MASS INDEX: 30.83 KG/M2 | SYSTOLIC BLOOD PRESSURE: 114 MMHG | RESPIRATION RATE: 16 BRPM

## 2023-01-20 DIAGNOSIS — E78.00 PURE HYPERCHOLESTEROLEMIA: ICD-10-CM

## 2023-01-20 DIAGNOSIS — Z13.29 SCREENING FOR HYPOTHYROIDISM: ICD-10-CM

## 2023-01-20 DIAGNOSIS — R35.1 NOCTURIA: ICD-10-CM

## 2023-01-20 DIAGNOSIS — Z13.6 SCREENING FOR HYPERTENSION: ICD-10-CM

## 2023-01-20 DIAGNOSIS — M79.642 LEFT HAND PAIN: ICD-10-CM

## 2023-01-20 DIAGNOSIS — Z13.0 SCREENING FOR DEFICIENCY ANEMIA: Primary | ICD-10-CM

## 2023-01-20 PROCEDURE — 99396 PREV VISIT EST AGE 40-64: CPT | Performed by: INTERNAL MEDICINE

## 2023-01-20 RX ORDER — OMEGA-3 FATTY ACIDS/FISH OIL 300-1000MG
CAPSULE ORAL
COMMUNITY

## 2023-01-20 RX ORDER — KRILL/OM-3/DHA/EPA/PHOSPHO/AST 500-110 MG
CAPSULE ORAL
COMMUNITY

## 2023-01-20 RX ORDER — SENNOSIDES 8.6 MG
CAPSULE ORAL
COMMUNITY

## 2023-01-20 RX ORDER — LIDOCAINE 50 MG/G
PATCH TOPICAL
COMMUNITY
Start: 2022-12-23

## 2023-01-20 RX ORDER — NAPROXEN SODIUM 220 MG
220 TABLET ORAL
COMMUNITY

## 2023-01-20 NOTE — PROGRESS NOTES
01/20/2023    CC: Annual Exam (PHYSICAL)  .        HPI  History of Present Illness     Subjective   Tiana Sena is a 57 y.o. female.      The following portions of the patient's history were reviewed and updated as appropriate: allergies, current medications, past family history, past medical history, past social history, past surgical history and problem list.    Problem List  Patient Active Problem List   Diagnosis   • DDD (degenerative disc disease), lumbar   • Lumbar radiculopathy   • Thoracic spinal stenosis   • Thoracic radiculopathy   • Spondylosis of lumbar region without myelopathy or radiculopathy       Past Medical History  Past Medical History:   Diagnosis Date   • Asthma    • Fall 2008   • Low back pain    • Osteoarthritis    • Spondylosis of lumbar region without myelopathy or radiculopathy 7/27/2020   • Thoracic radiculopathy 6/11/2020   • Thoracic spinal stenosis 6/11/2020       Surgical History  Past Surgical History:   Procedure Laterality Date   • ANKLE SURGERY Left    • BACK SURGERY N/A 07/01/2022    [facility];DR.DAVID MARTIN   • ENDOMETRIAL ABLATION     • EPIDURAL BLOCK     • GANGLION CYST EXCISION      2012, 2014   • NASAL SEPTUM SURGERY      2000's    • TONSILLECTOMY         Family History  Family History   Problem Relation Age of Onset   • Cancer Father    • Down syndrome Son        Social History  Social History    Tobacco Use      Smoking status: Never      Smokeless tobacco: Never       Is the Patient a current tobacco user? No    Allergies  Allergies   Allergen Reactions   • Phenobarbital Other (See Comments)   • Tetanus Toxoid Other (See Comments)       Current Medications    Current Outpatient Medications:   •  acetaminophen (TYLENOL) 650 MG 8 hr tablet, Take  by mouth., Disp: , Rfl:   •  ascorbic acid (VITAMIN C) 1000 MG tablet, Take 1,000 mg by mouth Daily., Disp: , Rfl:   •  B Complex Vitamins (VITAMIN B COMPLEX PO), Take  by mouth., Disp: , Rfl:   •  B  COMPLEX-C-BIOTIN-D-FA PO, Take  by mouth., Disp: , Rfl:   •  estradiol (ESTRACE) 1 MG tablet, Take 1 mg by mouth Daily., Disp: , Rfl:   •  fexofenadine (ALLEGRA) 180 MG tablet, fexofenadine 180 mg tablet  Take 1 tablet every day by oral route., Disp: , Rfl:   •  Ibuprofen 200 MG capsule, Take  by mouth., Disp: , Rfl:   •  Krill Oil (Omega-3) 500 MG capsule, Take  by mouth., Disp: , Rfl:   •  lidocaine (LIDODERM) 5 %, apply 1 patch to affected area for 12 hours per day, leave off for 12 hours, Disp: , Rfl:   •  Magnesium 100 MG capsule, Take  by mouth., Disp: , Rfl:   •  Menaquinone-7 (Vitamin K2) 100 MCG capsule, Take  by mouth., Disp: , Rfl:   •  naproxen sodium (ALEVE) 220 MG tablet, Take 220 mg by mouth., Disp: , Rfl:   •  Potassium 75 MG tablet, Take  by mouth., Disp: , Rfl:   •  Probiotic Product (PROBIOTIC DAILY PO), Take  by mouth., Disp: , Rfl:   •  progesterone (PROMETRIUM) 200 MG capsule, 200 mg Daily., Disp: , Rfl:   •  VITAMIN D PO, Take  by mouth., Disp: , Rfl:   •  VITAMIN E PO, Take  by mouth., Disp: , Rfl:      Review of System  Review of Systems   Constitutional: Negative.    HENT: Negative.    Eyes: Negative.    Respiratory: Negative.    Cardiovascular: Negative.    Gastrointestinal: Negative.    Endocrine: Negative.    Genitourinary: Negative.    Musculoskeletal: Negative.    Skin: Negative.    Allergic/Immunologic: Negative.    Neurological: Negative.    Hematological: Negative.    Psychiatric/Behavioral: Negative.      I have reviewed and confirmed the accuracy of the ROS as documented by the MA/LPN/RN Jacoby Torres MD    Vitals:    01/20/23 1039   BP: 114/68   Pulse: 69   Resp: 16   Temp: 98.1 °F (36.7 °C)   SpO2: 99%     Body mass index is 30.83 kg/m².    Objective     Physical Exam  Physical Exam  Vitals and nursing note reviewed.   Constitutional:       Appearance: She is well-developed.   HENT:      Head: Normocephalic and atraumatic.   Eyes:      Conjunctiva/sclera: Conjunctivae  normal.   Cardiovascular:      Rate and Rhythm: Normal rate and regular rhythm.      Heart sounds: Normal heart sounds.   Pulmonary:      Effort: Pulmonary effort is normal.      Breath sounds: Normal breath sounds.   Abdominal:      General: Bowel sounds are normal.      Palpations: Abdomen is soft.   Musculoskeletal:         General: Normal range of motion.      Cervical back: Normal range of motion and neck supple.   Skin:     General: Skin is warm and dry.   Neurological:      Mental Status: She is alert and oriented to person, place, and time.   Psychiatric:         Behavior: Behavior normal.         Assessment & Plan      This pleasant 57-year-old presents today for physical examination.  She is eligible for COVID booster as but declines.  She is currently on intermittent fast using the OMED diet.  She underwent   diet.    Back surgery from Dr. Rashid at [facility] several months ago for herniated lumbar disc.  She is recovering well without apparent residual effects.    Her blood pressure is excellent at 114/68 in the left arm sitting position and she has been taken off her Prinivil.    She is up-to-date with her Pap and breast exam.    She relates that she has some lack of feeling in the left thenar eminence has been present for about 6 months.    Regarding anticipatory guidance.  We discussed the importance of keeping her cholesterol low.  We gave her a low-cholesterol diet sheet with instructions on how to use it and we talked about it briefly.    We will see her back in 6 months she is progressing well at this point.  Diagnoses and all orders for this visit:    1. Screening for deficiency anemia (Primary)  -     CBC & Differential    2. Screening for hypertension  -     Comprehensive Metabolic Panel    3. Nocturia  -     Urinalysis With Culture If Indicated -    4. Screening for hypothyroidism  -     TSH    5. Pure hypercholesterolemia  -     Lipid Panel With / Chol / HDL Ratio    6. Left hand pain  -      Ambulatory Referral to Hand Surgery      Plan:  1.)  Follow-up in 6 months for reevaluation of weight and blood pressure.       Jacoby Torers MD  01/20/2023

## 2023-01-21 LAB
ALBUMIN SERPL-MCNC: 4.6 G/DL (ref 3.5–5.2)
ALBUMIN/GLOB SERPL: 2.7 G/DL
ALP SERPL-CCNC: 59 U/L (ref 39–117)
ALT SERPL-CCNC: 12 U/L (ref 1–33)
AST SERPL-CCNC: 11 U/L (ref 1–32)
BASOPHILS # BLD AUTO: 0.06 10*3/MM3 (ref 0–0.2)
BASOPHILS NFR BLD AUTO: 0.8 % (ref 0–1.5)
BILIRUB SERPL-MCNC: 0.2 MG/DL (ref 0–1.2)
BUN SERPL-MCNC: 11 MG/DL (ref 6–20)
BUN/CREAT SERPL: 15.1 (ref 7–25)
CALCIUM SERPL-MCNC: 9.8 MG/DL (ref 8.6–10.5)
CHLORIDE SERPL-SCNC: 106 MMOL/L (ref 98–107)
CHOLEST SERPL-MCNC: 260 MG/DL (ref 0–200)
CHOLEST/HDLC SERPL: 3.1 {RATIO}
CO2 SERPL-SCNC: 25.6 MMOL/L (ref 22–29)
CREAT SERPL-MCNC: 0.73 MG/DL (ref 0.57–1)
EGFRCR SERPLBLD CKD-EPI 2021: 94.9 ML/MIN/1.73
EOSINOPHIL # BLD AUTO: 0.21 10*3/MM3 (ref 0–0.4)
EOSINOPHIL NFR BLD AUTO: 2.6 % (ref 0.3–6.2)
ERYTHROCYTE [DISTWIDTH] IN BLOOD BY AUTOMATED COUNT: 12.3 % (ref 12.3–15.4)
GLOBULIN SER CALC-MCNC: 1.7 GM/DL
GLUCOSE SERPL-MCNC: 104 MG/DL (ref 65–99)
HCT VFR BLD AUTO: 42.7 % (ref 34–46.6)
HDLC SERPL-MCNC: 84 MG/DL (ref 40–60)
HGB BLD-MCNC: 14 G/DL (ref 12–15.9)
IMM GRANULOCYTES # BLD AUTO: 0.02 10*3/MM3 (ref 0–0.05)
IMM GRANULOCYTES NFR BLD AUTO: 0.3 % (ref 0–0.5)
LDLC SERPL CALC-MCNC: 167 MG/DL (ref 0–100)
LYMPHOCYTES # BLD AUTO: 3.18 10*3/MM3 (ref 0.7–3.1)
LYMPHOCYTES NFR BLD AUTO: 40.1 % (ref 19.6–45.3)
MCH RBC QN AUTO: 29.6 PG (ref 26.6–33)
MCHC RBC AUTO-ENTMCNC: 32.8 G/DL (ref 31.5–35.7)
MCV RBC AUTO: 90.3 FL (ref 79–97)
MONOCYTES # BLD AUTO: 0.51 10*3/MM3 (ref 0.1–0.9)
MONOCYTES NFR BLD AUTO: 6.4 % (ref 5–12)
NEUTROPHILS # BLD AUTO: 3.96 10*3/MM3 (ref 1.7–7)
NEUTROPHILS NFR BLD AUTO: 49.8 % (ref 42.7–76)
NRBC BLD AUTO-RTO: 0.1 /100 WBC (ref 0–0.2)
PLATELET # BLD AUTO: 301 10*3/MM3 (ref 140–450)
POTASSIUM SERPL-SCNC: 4.5 MMOL/L (ref 3.5–5.2)
PROT SERPL-MCNC: 6.3 G/DL (ref 6–8.5)
RBC # BLD AUTO: 4.73 10*6/MM3 (ref 3.77–5.28)
SODIUM SERPL-SCNC: 141 MMOL/L (ref 136–145)
TRIGL SERPL-MCNC: 58 MG/DL (ref 0–150)
TSH SERPL DL<=0.005 MIU/L-ACNC: 2.6 UIU/ML (ref 0.27–4.2)
VLDLC SERPL CALC-MCNC: 9 MG/DL (ref 5–40)
WBC # BLD AUTO: 7.94 10*3/MM3 (ref 3.4–10.8)

## 2023-08-11 ENCOUNTER — TRANSCRIBE ORDERS (OUTPATIENT)
Dept: ADMINISTRATIVE | Facility: HOSPITAL | Age: 58
End: 2023-08-11
Payer: MEDICARE

## 2023-08-11 ENCOUNTER — LAB (OUTPATIENT)
Dept: LAB | Facility: HOSPITAL | Age: 58
End: 2023-08-11
Payer: MEDICARE

## 2023-08-11 ENCOUNTER — HOSPITAL ENCOUNTER (OUTPATIENT)
Dept: CARDIOLOGY | Facility: HOSPITAL | Age: 58
Discharge: HOME | End: 2023-08-11
Payer: MEDICARE

## 2023-08-11 DIAGNOSIS — Z01.818 PRE-OP EXAM: ICD-10-CM

## 2023-08-11 DIAGNOSIS — Z01.818 PRE-OP EXAM: Primary | ICD-10-CM

## 2023-08-11 LAB
ANION GAP SERPL CALCULATED.3IONS-SCNC: 9.6 MMOL/L (ref 5–15)
BUN SERPL-MCNC: 16 MG/DL (ref 8–23)
BUN/CREAT SERPL: 22.5 (ref 7–25)
CALCIUM SPEC-SCNC: 9.6 MG/DL (ref 8.6–10.5)
CHLORIDE SERPL-SCNC: 110 MMOL/L (ref 98–107)
CO2 SERPL-SCNC: 22.4 MMOL/L (ref 22–29)
CREAT SERPL-MCNC: 0.71 MG/DL (ref 0.57–1)
EGFRCR SERPLBLD CKD-EPI 2021: 97.5 ML/MIN/1.73
GLUCOSE SERPL-MCNC: 95 MG/DL (ref 65–99)
POTASSIUM SERPL-SCNC: 4.3 MMOL/L (ref 3.5–5.2)
QT INTERVAL: 378 MS
SODIUM SERPL-SCNC: 142 MMOL/L (ref 136–145)

## 2023-08-11 PROCEDURE — 93005 ELECTROCARDIOGRAM TRACING: CPT | Performed by: PLASTIC SURGERY

## 2023-08-11 PROCEDURE — 80048 BASIC METABOLIC PNL TOTAL CA: CPT

## 2023-08-11 PROCEDURE — 36415 COLL VENOUS BLD VENIPUNCTURE: CPT

## 2024-01-15 ENCOUNTER — TELEPHONE (OUTPATIENT)
Dept: FAMILY MEDICINE CLINIC | Facility: CLINIC | Age: 59
End: 2024-01-15

## 2024-07-05 ENCOUNTER — OFFICE VISIT (OUTPATIENT)
Dept: FAMILY MEDICINE CLINIC | Facility: CLINIC | Age: 59
End: 2024-07-05
Payer: MEDICARE

## 2024-07-05 VITALS
BODY MASS INDEX: 33.49 KG/M2 | WEIGHT: 182 LBS | SYSTOLIC BLOOD PRESSURE: 120 MMHG | DIASTOLIC BLOOD PRESSURE: 80 MMHG | HEIGHT: 62 IN

## 2024-07-05 DIAGNOSIS — Z12.11 COLON CANCER SCREENING: ICD-10-CM

## 2024-07-05 DIAGNOSIS — R35.1 NOCTURIA: ICD-10-CM

## 2024-07-05 DIAGNOSIS — Z00.00 MEDICARE ANNUAL WELLNESS VISIT, INITIAL: ICD-10-CM

## 2024-07-05 DIAGNOSIS — D50.9 IRON DEFICIENCY ANEMIA, UNSPECIFIED IRON DEFICIENCY ANEMIA TYPE: ICD-10-CM

## 2024-07-05 DIAGNOSIS — I10 PRIMARY HYPERTENSION: Primary | ICD-10-CM

## 2024-07-05 DIAGNOSIS — E03.9 HYPOTHYROIDISM, UNSPECIFIED TYPE: ICD-10-CM

## 2024-07-05 DIAGNOSIS — E78.5 HYPERLIPIDEMIA, UNSPECIFIED HYPERLIPIDEMIA TYPE: ICD-10-CM

## 2024-07-05 PROCEDURE — 1160F RVW MEDS BY RX/DR IN RCRD: CPT | Performed by: INTERNAL MEDICINE

## 2024-07-05 PROCEDURE — 96160 PT-FOCUSED HLTH RISK ASSMT: CPT | Performed by: INTERNAL MEDICINE

## 2024-07-05 PROCEDURE — 1159F MED LIST DOCD IN RCRD: CPT | Performed by: INTERNAL MEDICINE

## 2024-07-05 PROCEDURE — G0438 PPPS, INITIAL VISIT: HCPCS | Performed by: INTERNAL MEDICINE

## 2024-07-05 PROCEDURE — 1170F FXNL STATUS ASSESSED: CPT | Performed by: INTERNAL MEDICINE

## 2024-07-05 RX ORDER — PROGESTERONE 200 MG/1
200 CAPSULE ORAL DAILY
COMMUNITY
Start: 2024-05-16

## 2024-07-05 NOTE — PROGRESS NOTES
The ABCs of the Annual Wellness Visit  Initial Medicare Wellness Visit    Chief Complaint   Patient presents with    Medicare Wellness-Initial Visit     Subjective   History of Present Illness:  Tiana Sena is a 58 y.o. female who presents for an Initial Medicare Wellness Visit.    The following portions of the patient's history were reviewed and   updated as appropriate: allergies, current medications, past family history, past medical history, past social history, past surgical history, and problem list.     Compared to one year ago, the patient feels her physical   health is worse.    Compared to one year ago, the patient feels her mental   health is the same.    Recent Hospitalizations:  She was not admitted to the hospital during the last year.       Current Medical Providers:  Patient Care Team:  Jacoby Torres MD as PCP - General (Internal Medicine)  Zeus Ingram MD as Consulting Physician (Anesthesiology)    Outpatient Medications Prior to Visit   Medication Sig Dispense Refill    acetaminophen (TYLENOL) 650 MG 8 hr tablet Take  by mouth.      ascorbic acid (VITAMIN C) 1000 MG tablet Take 1 tablet by mouth Daily.      B Complex Vitamins (VITAMIN B COMPLEX PO) Take  by mouth.      B COMPLEX-C-BIOTIN-D-FA PO Take  by mouth.      estradiol (ESTRACE) 1 MG tablet Take 1 tablet by mouth Daily.      fexofenadine (ALLEGRA) 180 MG tablet fexofenadine 180 mg tablet   Take 1 tablet every day by oral route.      Ibuprofen 200 MG capsule Take  by mouth.      Krill Oil (Omega-3) 500 MG capsule Take  by mouth.      Magnesium 100 MG capsule Take  by mouth.      Menaquinone-7 (Vitamin K2) 100 MCG capsule Take  by mouth.      naproxen sodium (ALEVE) 220 MG tablet Take 1 tablet by mouth.      Probiotic Product (PROBIOTIC DAILY PO) Take  by mouth.      progesterone (PROMETRIUM) 200 MG capsule 1 capsule Daily.      Progesterone (PROMETRIUM) 200 MG capsule Take 1 capsule by mouth Daily.      VITAMIN D PO Take  by  "mouth.      lidocaine (LIDODERM) 5 % apply 1 patch to affected area for 12 hours per day, leave off for 12 hours      Potassium 75 MG tablet Take  by mouth.      VITAMIN E PO Take  by mouth.       No facility-administered medications prior to visit.       No opioid medication identified on active medication list. I have reviewed chart for other potential  high risk medication/s and harmful drug interactions in the elderly.        Aspirin is not on active medication list.  Aspirin use is not indicated based on review of current medical condition/s. Risk of harm outweighs potential benefits.  .    Patient Active Problem List   Diagnosis    DDD (degenerative disc disease), lumbar    Lumbar radiculopathy    Thoracic spinal stenosis    Thoracic radiculopathy    Spondylosis of lumbar region without myelopathy or radiculopathy     Advance Care Planning  Advance Directive is not on file.  ACP discussion was held with the patient during this visit. Patient does not have an advance directive, information provided.    Review of Systems   Constitutional:  Negative for chills and fever.   Respiratory:  Negative for cough and shortness of breath.    Cardiovascular:  Negative for chest pain and palpitations.   Gastrointestinal:  Negative for constipation, diarrhea, nausea and vomiting.   Neurological:  Negative for dizziness.        Objective       Vitals:    07/05/24 0924   BP: 120/80   Weight: 82.6 kg (182 lb)   Height: 157.5 cm (62\")     Estimated body mass index is 33.29 kg/m² as calculated from the following:    Height as of this encounter: 157.5 cm (62\").    Weight as of this encounter: 82.6 kg (182 lb).    BMI is >= 30 and <35. (Class 1 Obesity). The following options were offered after discussion;: weight loss educational material (shared in after visit summary)      Does the patient have evidence of cognitive impairment? No    Physical Exam  Vitals and nursing note reviewed.   Constitutional:       Appearance: She is " well-developed.   HENT:      Head: Normocephalic and atraumatic.   Eyes:      Conjunctiva/sclera: Conjunctivae normal.   Cardiovascular:      Rate and Rhythm: Normal rate and regular rhythm.      Heart sounds: Normal heart sounds.   Pulmonary:      Effort: Pulmonary effort is normal.      Breath sounds: Normal breath sounds.   Abdominal:      General: Bowel sounds are normal.      Palpations: Abdomen is soft.   Musculoskeletal:         General: Normal range of motion.      Cervical back: Normal range of motion and neck supple.   Skin:     General: Skin is warm and dry.   Neurological:      Mental Status: She is alert and oriented to person, place, and time.   Psychiatric:         Behavior: Behavior normal.               HEALTH RISK ASSESSMENT    Smoking Status:  Social History     Tobacco Use   Smoking Status Never   Smokeless Tobacco Never     Alcohol Consumption:  Social History     Substance and Sexual Activity   Alcohol Use No     Fall Risk Screen:    STEADI Fall Risk Assessment was completed, and patient is at LOW risk for falls.Assessment completed on:2024    Depression Screen:       2024     9:36 AM   PHQ-2/PHQ-9 Depression Screening   Little Interest or Pleasure in Doing Things 0-->not at all   Feeling Down, Depressed or Hopeless 0-->not at all   PHQ-9: Brief Depression Severity Measure Score 0       Health Habits and Functional and Cognitive Screenin/4/2024    10:39 AM   Functional & Cognitive Status   Do you have difficulty preparing food and eating? No   Do you have difficulty bathing yourself, getting dressed or grooming yourself? No   Do you have difficulty using the toilet? No   Do you have difficulty moving around from place to place? No   Do you have trouble with steps or getting out of a bed or a chair? No   Current Diet Low Carb Diet   Dental Exam Up to date   Eye Exam Up to date   Exercise (times per week) Other   Current Exercises Include Walking   Do you need help using the  phone?  No   Are you deaf or do you have serious difficulty hearing?  No   Do you need help to go to places out of walking distance? No   Do you need help shopping? No   Do you need help preparing meals?  No   Do you need help with housework?  Yes   Do you need help with laundry? Yes   Do you need help taking your medications? No   Do you need help managing money? No   Do you ever drive or ride in a car without wearing a seat belt? No   Have you felt unusual stress, anger or loneliness in the last month? Yes   Who do you live with? Spouse   If you need help, do you have trouble finding someone available to you? No   Have you been bothered in the last four weeks by sexual problems? No   Do you have difficulty concentrating, remembering or making decisions? No       Age-appropriate Screening Schedule:  Refer to the list below for future screening recommendations based on patient's age, sex and/or medical conditions. Orders for these recommended tests are listed in the plan section. The patient has been provided with a written plan.    Health Maintenance   Topic Date Due    PAP SMEAR  Never done    LIPID PANEL  01/20/2024    COLORECTAL CANCER SCREENING  09/29/2024    RSV Vaccine - Adults (1 - 1-dose 60+ series) 08/05/2024 (Originally 7/29/2023)    COVID-19 Vaccine (2 - 2023-24 season) 09/05/2024 (Originally 9/1/2023)    ZOSTER VACCINE (1 of 2) 09/05/2024 (Originally 7/29/2013)    TDAP/TD VACCINES (1 - Tdap) 07/05/2025 (Originally 7/29/1982)    INFLUENZA VACCINE  08/01/2024    ANNUAL WELLNESS VISIT  07/05/2025    BMI FOLLOWUP  07/05/2025    MAMMOGRAM  01/26/2026    HEPATITIS C SCREENING  Completed    Pneumococcal Vaccine 0-64  Aged Out            Assessment & Plan   This pleasant 58-year-old presents at this time for Medicare wellness review initial.  She is currently out with disability secondary to chronic back problems.  She has a new neurosurgeon and is due to follow-up with him in the next several  months.    Regarding anticipatory guidance we discussed the importance of keeping her LDL cholesterol less than 100.  We gave her a low-cholesterol diet sheet for implementation At our direction.  A review of her labs from 1/20/2023 shows that her lipid panel with an LDL of 167.  Her comprehensive metabolic    Profile was essentially normal.    CMS Preventative Services Quick Reference  Risk Factors Identified During Encounter  None Identified  The above risks/problems have been discussed with the patient.  Follow up actions/plans if indicated are seen below in the Assessment/Plan Section.  Pertinent information has been shared with the patient in the After Visit Summary.    Diagnoses and all orders for this visit:    1. Primary hypertension (Primary)  -     Comprehensive Metabolic Panel    2. Hyperlipidemia, unspecified hyperlipidemia type  -     Lipid Panel With / Chol / HDL Ratio    3. Nocturia  -     Urinalysis With Culture If Indicated -    4. Iron deficiency anemia, unspecified iron deficiency anemia type  -     CBC & Differential    5. Colon cancer screening    6. Hypothyroidism, unspecified type  -     Ambulatory Referral For Screening Colonoscopy    7. Medicare annual wellness visit, initial        Follow Up:  No follow-ups on file.     An After Visit Summary and PPPS were made available to the patient.

## 2024-07-06 LAB
ALBUMIN SERPL-MCNC: 4.6 G/DL (ref 3.5–5.2)
ALBUMIN/GLOB SERPL: 2.4 G/DL
ALP SERPL-CCNC: 68 U/L (ref 39–117)
ALT SERPL-CCNC: 11 U/L (ref 1–33)
APPEARANCE UR: CLEAR
AST SERPL-CCNC: 16 U/L (ref 1–32)
BACTERIA #/AREA URNS HPF: NORMAL /[HPF]
BASOPHILS # BLD AUTO: 0.06 10*3/MM3 (ref 0–0.2)
BASOPHILS NFR BLD AUTO: 0.7 % (ref 0–1.5)
BILIRUB SERPL-MCNC: 0.3 MG/DL (ref 0–1.2)
BILIRUB UR QL STRIP: NEGATIVE
BUN SERPL-MCNC: 10 MG/DL (ref 8–23)
BUN/CREAT SERPL: 14.3 (ref 7–25)
CALCIUM SERPL-MCNC: 10.1 MG/DL (ref 8.6–10.5)
CASTS URNS QL MICRO: NORMAL /LPF
CHLORIDE SERPL-SCNC: 104 MMOL/L (ref 98–107)
CHOLEST SERPL-MCNC: 245 MG/DL (ref 0–200)
CHOLEST/HDLC SERPL: 3.02 {RATIO}
CO2 SERPL-SCNC: 24.4 MMOL/L (ref 22–29)
COLOR UR: YELLOW
CREAT SERPL-MCNC: 0.7 MG/DL (ref 0.57–1)
EGFRCR SERPLBLD CKD-EPI 2021: 99.2 ML/MIN/1.73
EOSINOPHIL # BLD AUTO: 0.16 10*3/MM3 (ref 0–0.4)
EOSINOPHIL NFR BLD AUTO: 1.9 % (ref 0.3–6.2)
EPI CELLS #/AREA URNS HPF: NORMAL /HPF (ref 0–10)
ERYTHROCYTE [DISTWIDTH] IN BLOOD BY AUTOMATED COUNT: 12.7 % (ref 12.3–15.4)
GLOBULIN SER CALC-MCNC: 1.9 GM/DL
GLUCOSE SERPL-MCNC: 112 MG/DL (ref 65–99)
GLUCOSE UR QL STRIP: NEGATIVE
HCT VFR BLD AUTO: 43.5 % (ref 34–46.6)
HDLC SERPL-MCNC: 81 MG/DL (ref 40–60)
HGB BLD-MCNC: 13.9 G/DL (ref 12–15.9)
HGB UR QL STRIP: NEGATIVE
IMM GRANULOCYTES # BLD AUTO: 0.03 10*3/MM3 (ref 0–0.05)
IMM GRANULOCYTES NFR BLD AUTO: 0.4 % (ref 0–0.5)
KETONES UR QL STRIP: NEGATIVE
LDLC SERPL CALC-MCNC: 149 MG/DL (ref 0–100)
LEUKOCYTE ESTERASE UR QL STRIP: NEGATIVE
LYMPHOCYTES # BLD AUTO: 2.64 10*3/MM3 (ref 0.7–3.1)
LYMPHOCYTES NFR BLD AUTO: 31.5 % (ref 19.6–45.3)
MCH RBC QN AUTO: 28.6 PG (ref 26.6–33)
MCHC RBC AUTO-ENTMCNC: 32 G/DL (ref 31.5–35.7)
MCV RBC AUTO: 89.5 FL (ref 79–97)
MICRO URNS: NORMAL
MICRO URNS: NORMAL
MONOCYTES # BLD AUTO: 0.63 10*3/MM3 (ref 0.1–0.9)
MONOCYTES NFR BLD AUTO: 7.5 % (ref 5–12)
NEUTROPHILS # BLD AUTO: 4.86 10*3/MM3 (ref 1.7–7)
NEUTROPHILS NFR BLD AUTO: 58 % (ref 42.7–76)
NITRITE UR QL STRIP: NEGATIVE
NRBC BLD AUTO-RTO: 0 /100 WBC (ref 0–0.2)
PH UR STRIP: 6 [PH] (ref 5–7.5)
PLATELET # BLD AUTO: 306 10*3/MM3 (ref 140–450)
POTASSIUM SERPL-SCNC: 4.3 MMOL/L (ref 3.5–5.2)
PROT SERPL-MCNC: 6.5 G/DL (ref 6–8.5)
PROT UR QL STRIP: NEGATIVE
RBC # BLD AUTO: 4.86 10*6/MM3 (ref 3.77–5.28)
RBC #/AREA URNS HPF: NORMAL /HPF (ref 0–2)
SODIUM SERPL-SCNC: 138 MMOL/L (ref 136–145)
SP GR UR STRIP: 1.01 (ref 1–1.03)
TRIGL SERPL-MCNC: 89 MG/DL (ref 0–150)
URINALYSIS REFLEX: NORMAL
UROBILINOGEN UR STRIP-MCNC: 0.2 MG/DL (ref 0.2–1)
VLDLC SERPL CALC-MCNC: 15 MG/DL (ref 5–40)
WBC # BLD AUTO: 8.38 10*3/MM3 (ref 3.4–10.8)
WBC #/AREA URNS HPF: NORMAL /HPF (ref 0–5)

## 2024-07-15 DIAGNOSIS — Z12.11 ENCOUNTER FOR SCREENING COLONOSCOPY: Primary | ICD-10-CM

## 2024-08-06 ENCOUNTER — PREP FOR SURGERY (OUTPATIENT)
Dept: OTHER | Facility: HOSPITAL | Age: 59
End: 2024-08-06
Payer: MEDICARE

## 2024-08-06 DIAGNOSIS — Z12.11 SCREEN FOR COLON CANCER: Primary | ICD-10-CM

## 2024-11-14 NOTE — ANESTHESIA PROCEDURE NOTES
PRE-OPERATIVE NOTE   Chief Complaint had concerns including Pre-Op Exam ( ROBOTIC XI PROSTATECTOMY WITH PELVIC LYMPH NODE DISSECTION on 12/11/2024 with  ).  Surgery Date: 12/11/2024  Planned Surgery: ROBOTIC XI PROSTATECTOMY WITH PELVIC LYMPH NODE DISSECTION  Type of Anesthesia: General  Pre-op Diagnosis: Prostate cancer screening information given during patient encounter [Z12.5]  Requesting Surgeon: Richard Zhou MD    Subjective   BRIEF HISTORY LEADING to SURGERY: Jeb Snider is a 69 year old male here for pre-operative anesthesia consult for surgery as requested by the surgeon.     The patient is here for preop evaluation for robotic prostatectomy with pelvic lymph node dissection on the 11th with Dr. Zhou. He established with me almost a month ago and at that time was having some  urinary symptoms.    He began experiencing symptoms a month ago. A urinalysis revealed pan-sensitive E. coli, leading to a 10-day course of Bactrim. His symptoms improved, but sx started to come back several days ago and a urinalysis conducted a few days ago suggested another urinary tract infection (UTI). The results of his urine culture are still pending.  Was able to get started on ciprofloxacin and is improving symptomatically.    Patient had an EKG in September which wasNormal sinus rhythm normal EKG    HTN:  Still having some elevated readings at home.  Checks it at home and if elevated get this double checked at the pharmacy so all these readings are double checked. last couple days his readings have been even higher as unfortunately there was some issues with our office with communication/orders which understandably has because some significant distress.        Review of Systems  Negative unless otherwise specified above    Objective     PHYSICAL EXAM   General: Alert no acute distress  Respiratory: CTAB   CV: RRR, S1, S2, no significant lower extremity edema  Abdomen: Soft, no distention, no guarding, no  This patient did not have a procedure today.      Patient reassessed immediately prior to procedure    Performed by  Anesthesiologist: Zeus Ingram MD          This patient did not have a procedure today.   rebound.  Skin: No rashes  Neuro: Appropriate mood. Cranial nerves grossly intact      RESULTS REVIEW         Most Recent  Na+  141 (9/26/2024)  K+  5 (9/26/2024)   Glucose  112 (9/26/2024)  GFR  >90 (9/26/2024)        Most Recent  WBC  4.5 (9/26/2024)  HGB  12.8 (9/26/2024)     Platelet  295 (9/26/2024)       The last Hemoglobin was abnormal at 12.8 (9/26/2024) (Normal 13 - 17 g/dL).The last INR was elevated at 1.2 (9/17/2024).      Medications, allergies, past medical, surgical, social and family histories were reviewed and updated as appropriate, see encounter summary. Pertinent surgical risks and history are below.  SURGICAL RISK AND SCREENINGS     Family History Risks  Adverse Reaction to Anesthesia: No  Bleeding or Blood Disorder: No  Malignant Hyperthermia: No    Personal Surgical History  Anesthetic Complications: No  Bleeding Problems: No  Problems with Surgery: No      Malnutrition Screening Tool Current Weight 302 lbs  Have you recently lost weight without trying? No  Have you been eating poorly because of a decreased appetite? No  Score = 0, Not at Risk       Functional Capacity  Are you able to do light housework, dusting, wash dishes, climb a flight of stairs or walk up a hill without chest pain or problems breathing (>4 METS)? Yes    Revised Cardiac Risk Index  Intermediate or Higher Risk Surgery Yes   History of Ischemic Heart Disease or Cardiac Chest Pain No;    Congestive Heart Failure No; Ejection Fraction 55 (09/17/2024)   History of Stroke or TIA No   Last Creatinine >2 No; 0.77 g/dL (09/26/2024)   Prescribed Insulin No   Estimated Risk of a Major Cardiac Complication 1 risk factor = 6.0%       Acute Kidney Injury Prevention:  RODOLFO Risk is Medium based on criteria below:  Age >65  Recommendations  - 24 hours before surgery hold ACE-I/ARB, Diuretics and Potassium  - 5 days before surgery hold NSAIDs    Advance care planning documents on file - no     ASSESSMENT AND PLAN            1. Pre-op  evaluation  2. Class 2 severe obesity due to excess calories with serious comorbidity and body mass index (BMI) of 38.0 to 38.9 in adult (CMD)  3. Prostate cancer  (CMD)  4. ROGERS (nonalcoholic steatohepatitis)  5. Essential hypertension  6. Abnormal urinalysis      I am actively treating UTI.  Will have to follow-up with the culture results of that and likely will want to have a test of cure prior to his procedure.  I will plan to retest this about a week and a half before his procedure to give us enough time to possibly restart treatment if we need to to make all attempts from having to delay surgery. will reach out to Dr. Zhou if there is any abnormalities with this before the procedure.     Preop Optimization  - OPTIMIZED for SURGERY: YES patient is medically optimized for surgery pending test results follow up on urine.  - Workup reviewed and/or ordered: See Orders  - Pre-Op management of pacemaker, dialysis or steroids: not needed.  - Post-Op DVT prophylaxis: per surgical team  - Smoking Status: Never Smoker  .     Pertinent Conditions    #Essential Hypertension today's blood pressure was There were no BP taken for this visit.    #Prostate Cancer (Cmd)    #Hepatic Fibrosis, Unspecified      Medications to Hold     Medications and Supplements:  - Morning of surgery hold any Vitamins AND for 2 weeks prior hold any Vitamin E or Herbals     Anticoagulation:   - 2 days before hold (Xarelto 20 MG Tab [[id number]])    Antidiabetic:none on med list    RDOOLFO Risk (Diuretics, ACE-I/ARB, NSAIDs):   - 24 hours before surgery hold (lisinopril (ZESTRIL) 20 MG tablet [[id number]])      Continue all other medications unless an alternative plan was advised with the surgeon and/or specialist.          Electronically signed by: Quintin Parra DO  Copy sent to: Richard Zhou MD

## 2024-12-05 ENCOUNTER — OFFICE VISIT (OUTPATIENT)
Dept: FAMILY MEDICINE CLINIC | Facility: CLINIC | Age: 59
End: 2024-12-05
Payer: MEDICARE

## 2024-12-05 VITALS
WEIGHT: 183 LBS | BODY MASS INDEX: 33.68 KG/M2 | HEIGHT: 62 IN | DIASTOLIC BLOOD PRESSURE: 70 MMHG | SYSTOLIC BLOOD PRESSURE: 110 MMHG

## 2024-12-05 DIAGNOSIS — E78.5 HYPERLIPIDEMIA, UNSPECIFIED HYPERLIPIDEMIA TYPE: ICD-10-CM

## 2024-12-05 DIAGNOSIS — R73.9 BLOOD GLUCOSE ELEVATED: Primary | ICD-10-CM

## 2024-12-05 LAB
EXPIRATION DATE: ABNORMAL
HBA1C MFR BLD: 6 % (ref 4.5–5.7)
Lab: ABNORMAL

## 2024-12-05 RX ORDER — ESTRADIOL 0.1 MG/G
1 CREAM VAGINAL 2 TIMES WEEKLY
COMMUNITY
Start: 2024-11-07

## 2024-12-05 NOTE — PROGRESS NOTES
12/05/2024    Assessment & Plan     This pleasant 59-year-old was a long-term patient of ours presents at this time for follow-up of hyperlipidemia.  She has long had a history of elevated LDL.  She feels at this time that she can better improve it with a dietary change.  We reviewed her lab abs with her showing Her last LDL of 7/5/2024 being very elevated at 149 her previous level 1 year ago was also more elevated at 167.  We discussed with her the importance of lowering his cholesterol and helping to prevent complications of myocardial infarction cerebrovascular accidents and other evidences of atherosclerosis however she is reluctant at this point to start medication and insist that she can lower it with diet.  She is willing to give it a 2-month trial and then we will rediscuss this issue.    Her blood pressure is excellent today at 110/70 in the left arm sitting position standard cuff.  Weight is essentially unchanged over the past 5 months that 183 pounds with a BMI of 33.5.  She has had problems with back pain in the past and relates that she has benefited from aqua therapy but with the cold weather that is not possible at this time.  In any case she is willing to try to increase her physical exertion through walking in an effort to get her cholesterol down.  We gave her a low-cholesterol diet sheet.    Her last glucose level was slightly elevated at 112 her hemoglobin A1c today is 6.0.    Diagnoses and all orders for this visit:    1. Blood glucose elevated (Primary)  -     POCT glycated hemoglobin, total    2. Hyperlipidemia, unspecified hyperlipidemia type         Plan:  1.)  Follow-up in 2 months for reevaluation of lipid profile  2.)  The patient will more aggressively pursue a low-cholesterol diet regimen.         CC: Hypertension (F/U) and Hyperlipidemia (F/U labs.)  .        HPI  History of Present Illness     Subjective   Tiana Sena is a 59 y.o. female.      The following portions of the  patient's history were reviewed and updated as appropriate: allergies, current medications, past family history, past medical history, past social history, past surgical history, and problem list.    Problem List  Patient Active Problem List   Diagnosis    DDD (degenerative disc disease), lumbar    Lumbar radiculopathy    Thoracic spinal stenosis    Thoracic radiculopathy    Spondylosis of lumbar region without myelopathy or radiculopathy       Past Medical History  Past Medical History:   Diagnosis Date    Asthma     Fall 2008    Low back pain     Osteoarthritis     Spondylosis of lumbar region without myelopathy or radiculopathy 07/27/2020    Thoracic radiculopathy 06/11/2020    Thoracic spinal stenosis 06/11/2020    Visual impairment        Surgical History  Past Surgical History:   Procedure Laterality Date    ANKLE SURGERY Left     BACK SURGERY N/A 07/01/2022    [facility] [place];DR.DAVID MARTIN    ENDOMETRIAL ABLATION      EPIDURAL BLOCK      GANGLION CYST EXCISION      2012, 2014    NASAL SEPTUM SURGERY      2000's     SINUS SURGERY      SPINE SURGERY      TONSILLECTOMY         Family History  Family History   Problem Relation Age of Onset    Cancer Father     Early death Father     Thyroid disease Father     Down syndrome Son     Drug abuse Son     Early death Son     Diabetes Mother     Hearing loss Mother     Drug abuse Son        Social History  Social History    Tobacco Use      Smoking status: Never      Smokeless tobacco: Never       Is the Patient a current tobacco user? No    Allergies  Allergies   Allergen Reactions    Phenobarbital Other (See Comments)    Tetanus Toxoid Other (See Comments)       Current Medications    Current Outpatient Medications:     acetaminophen (TYLENOL) 650 MG 8 hr tablet, Take  by mouth., Disp: , Rfl:     estradiol (ESTRACE) 0.1 MG/GM vaginal cream, Insert 1 g into the vagina 2 (Two) Times a Week., Disp: , Rfl:     estradiol (ESTRACE) 1 MG tablet, Take 1 tablet by mouth  Daily., Disp: , Rfl:     fexofenadine (ALLEGRA) 180 MG tablet, fexofenadine 180 mg tablet  Take 1 tablet every day by oral route., Disp: , Rfl:     Ibuprofen 200 MG capsule, Take  by mouth., Disp: , Rfl:     Krill Oil (Omega-3) 500 MG capsule, Take  by mouth., Disp: , Rfl:     Magnesium 100 MG capsule, Take  by mouth., Disp: , Rfl:     Menaquinone-7 (Vitamin K2) 100 MCG capsule, Take  by mouth., Disp: , Rfl:     naproxen sodium (ALEVE) 220 MG tablet, Take 1 tablet by mouth., Disp: , Rfl:     Probiotic Product (PROBIOTIC DAILY PO), Take  by mouth., Disp: , Rfl:     progesterone (PROMETRIUM) 200 MG capsule, 1 capsule Daily., Disp: , Rfl:     VITAMIN D PO, Take  by mouth., Disp: , Rfl:     ascorbic acid (VITAMIN C) 1000 MG tablet, Take 1 tablet by mouth Daily. (Patient not taking: Reported on 12/5/2024), Disp: , Rfl:     B Complex Vitamins (VITAMIN B COMPLEX PO), Take  by mouth. (Patient not taking: Reported on 12/5/2024), Disp: , Rfl:     B COMPLEX-C-BIOTIN-D-FA PO, Take  by mouth. (Patient not taking: Reported on 12/5/2024), Disp: , Rfl:     CALCIUM CARB-CHOLECALCIFEROL PO, Take  by mouth. 600/400MG, Disp: , Rfl:     Desiccated Beef Liver powder, Use. CAPSULES, Disp: , Rfl:     Progesterone (PROMETRIUM) 200 MG capsule, Take 1 capsule by mouth Daily., Disp: , Rfl:      Review of System  Review of Systems  I have reviewed and confirmed the accuracy of the ROS as documented by the MA/LPN/RN Jacoby Torres MD    Vitals:    12/05/24 1108   BP: 110/70     Body mass index is 33.47 kg/m².    Objective     Physical Exam  Physical Exam        Jacoby Torres MD  12/05/2024

## 2025-02-06 ENCOUNTER — OFFICE VISIT (OUTPATIENT)
Dept: FAMILY MEDICINE CLINIC | Facility: CLINIC | Age: 60
End: 2025-02-06
Payer: MEDICARE

## 2025-02-06 VITALS
HEIGHT: 62 IN | WEIGHT: 182 LBS | DIASTOLIC BLOOD PRESSURE: 86 MMHG | SYSTOLIC BLOOD PRESSURE: 130 MMHG | BODY MASS INDEX: 33.49 KG/M2

## 2025-02-06 DIAGNOSIS — E78.5 HYPERLIPIDEMIA, UNSPECIFIED HYPERLIPIDEMIA TYPE: Primary | ICD-10-CM

## 2025-02-06 PROCEDURE — 1159F MED LIST DOCD IN RCRD: CPT | Performed by: INTERNAL MEDICINE

## 2025-02-06 PROCEDURE — 1160F RVW MEDS BY RX/DR IN RCRD: CPT | Performed by: INTERNAL MEDICINE

## 2025-02-06 PROCEDURE — 99214 OFFICE O/P EST MOD 30 MIN: CPT | Performed by: INTERNAL MEDICINE

## 2025-02-06 NOTE — PROGRESS NOTES
02/06/2025    Summarization of Visit       Assessment & Plan     Assessment & Plan  1. Elevated LDL cholesterol.  Her LDL cholesterol levels have shown a slight improvement, with the most recent reading at 149, down from a previous level of 167. However, these levels remain significantly elevated for her age group. She has expressed a preference against pharmacological intervention for her high cholesterol levels. A calcium CT scan will be ordered to assess the extent of any potential blockages. Additionally, a cholesterol panel will be conducted today to monitor her lipid levels. If the calcium CT scan reveals significant blockages, medication will be recommended to manage her cholesterol levels.    Total time spent was 30 minutes during which we discussed in detail the proximal hand calcium CT scoring the principles of generation of atherosclerosis and discussed where her LDL and other lipid levels fell in the continuing of preventing strokes and heart attacks.    Follow-up  The patient will follow up in a few weeks to discuss the results of the calcium CT scan.    Results  Laboratory Studies  LDL cholesterol was 149, previously it was 167.                CC: Hyperlipidemia (F/U.  Wanting a CT calcium test.---no other issues)  .        HPI  Hyperlipidemia  Pertinent negatives include no chest pain or myalgias.      History of Present Illness  The patient presents for evaluation of elevated LDL cholesterol.    She has been actively engaged in a dietary regimen since 01/01/2024, which includes fasting on Tuesdays and Thursdays, supplemented by a ketogenic carnivore diet during the intervening periods. This regimen has resulted in a weight loss of approximately 7 to 8 pounds. She expresses interest in undergoing a calcium CT scan. She is currently fasting and is not amenable to pharmacological intervention for her elevated cholesterol levels.    Juan José Sena is a 59 y.o. female.      The following  portions of the patient's history were reviewed and updated as appropriate: allergies, current medications, past family history, past medical history, past social history, past surgical history, and problem list.    Problem List  Patient Active Problem List   Diagnosis    DDD (degenerative disc disease), lumbar    Lumbar radiculopathy    Thoracic spinal stenosis    Thoracic radiculopathy    Spondylosis of lumbar region without myelopathy or radiculopathy       Past Medical History  Past Medical History:   Diagnosis Date    Asthma     Fall 2008    Low back pain     Osteoarthritis     Spondylosis of lumbar region without myelopathy or radiculopathy 07/27/2020    Thoracic radiculopathy 06/11/2020    Thoracic spinal stenosis 06/11/2020    Visual impairment        Surgical History  Past Surgical History:   Procedure Laterality Date    ANKLE SURGERY Left     BACK SURGERY N/A 07/01/2022    [facility] [place];DR.DAVID MARTIN    COLONOSCOPY  2024    Don't remember the date of the first one. Two since then were mail in ones with the last one being in 2024    ENDOMETRIAL ABLATION      EPIDURAL BLOCK      GANGLION CYST EXCISION      2012, 2014    NASAL SEPTUM SURGERY      2000's     SINUS SURGERY      SPINE SURGERY      TONSILLECTOMY         Family History  Family History   Problem Relation Age of Onset    Cancer Father     Early death Father     Thyroid disease Father     Down syndrome Son     Drug abuse Son     Early death Son     Diabetes Mother     Hearing loss Mother     Drug abuse Son        Social History  Social History    Tobacco Use      Smoking status: Never      Smokeless tobacco: Never       Is the Patient a current tobacco user? No    Allergies  Allergies   Allergen Reactions    Phenobarbital Other (See Comments)    Tetanus Toxoid Other (See Comments)       Current Medications    Current Outpatient Medications:     acetaminophen (TYLENOL) 650 MG 8 hr tablet, Take  by mouth., Disp: , Rfl:     CALCIUM  CARB-CHOLECALCIFEROL PO, Take  by mouth. 600/400MG, Disp: , Rfl:     Desiccated Beef Liver powder, Use. CAPSULES, Disp: , Rfl:     estradiol (ESTRACE) 0.1 MG/GM vaginal cream, Insert 1 g into the vagina 2 (Two) Times a Week., Disp: , Rfl:     estradiol (ESTRACE) 1 MG tablet, Take 1 tablet by mouth Daily., Disp: , Rfl:     fexofenadine (ALLEGRA) 180 MG tablet, fexofenadine 180 mg tablet  Take 1 tablet every day by oral route., Disp: , Rfl:     Ibuprofen 200 MG capsule, Take  by mouth., Disp: , Rfl:     Krill Oil (Omega-3) 500 MG capsule, Take  by mouth., Disp: , Rfl:     Magnesium 100 MG capsule, Take  by mouth., Disp: , Rfl:     Menaquinone-7 (Vitamin K2) 100 MCG capsule, Take  by mouth., Disp: , Rfl:     naproxen sodium (ALEVE) 220 MG tablet, Take 1 tablet by mouth., Disp: , Rfl:     Probiotic Product (PROBIOTIC DAILY PO), Take  by mouth., Disp: , Rfl:     progesterone (PROMETRIUM) 200 MG capsule, 1 capsule Daily., Disp: , Rfl:     Progesterone (PROMETRIUM) 200 MG capsule, Take 1 capsule by mouth Daily., Disp: , Rfl:     VITAMIN D PO, Take  by mouth., Disp: , Rfl:     ascorbic acid (VITAMIN C) 1000 MG tablet, Take 1 tablet by mouth Daily. (Patient not taking: Reported on 2/6/2025), Disp: , Rfl:     B Complex Vitamins (VITAMIN B COMPLEX PO), Take  by mouth. (Patient not taking: Reported on 2/6/2025), Disp: , Rfl:     B COMPLEX-C-BIOTIN-D-FA PO, Take  by mouth. (Patient not taking: Reported on 2/6/2025), Disp: , Rfl:      Review of System  Review of Systems   Constitutional:  Negative for chills, diaphoresis, fatigue and fever.   HENT:  Negative for congestion, sore throat and swollen glands.    Respiratory:  Negative for cough.    Cardiovascular:  Negative for chest pain.   Gastrointestinal:  Negative for abdominal pain, nausea and vomiting.   Genitourinary:  Negative for dysuria.   Musculoskeletal:  Negative for myalgias and neck pain.   Skin:  Negative for rash.   Neurological:  Negative for weakness and numbness.      I have reviewed and confirmed the accuracy of the ROS as documented by the MA/LPN/RN Jacoby Torres MD    Vitals:    02/06/25 1056   BP: 130/86     Body mass index is 33.29 kg/m².    Objective     Physical Exam  Physical Exam  HENT:      Head: Normocephalic and atraumatic.   Cardiovascular:      Rate and Rhythm: Normal rate and regular rhythm.   Pulmonary:      Effort: Pulmonary effort is normal.      Breath sounds: Normal breath sounds.   Skin:     General: Skin is warm.   Neurological:      Mental Status: She is alert.           Patient or patient representative verbalized consent for the use of Ambient Listening during the visit with  Jacoby Torres MD for chart documentation. 2/6/2025  11:40 EST    Jacoby Torres MD  02/06/2025

## 2025-03-10 ENCOUNTER — HOSPITAL ENCOUNTER (OUTPATIENT)
Dept: CARDIOLOGY | Facility: HOSPITAL | Age: 60
Discharge: HOME | End: 2025-03-10
Payer: MEDICARE

## 2025-03-10 ENCOUNTER — TRANSCRIBE ORDERS (OUTPATIENT)
Dept: ADMINISTRATIVE | Facility: HOSPITAL | Age: 60
End: 2025-03-10
Payer: MEDICARE

## 2025-03-10 ENCOUNTER — LAB (OUTPATIENT)
Dept: LAB | Facility: HOSPITAL | Age: 60
End: 2025-03-10
Payer: MEDICARE

## 2025-03-10 DIAGNOSIS — Z01.818 PRE-OP TESTING: ICD-10-CM

## 2025-03-10 DIAGNOSIS — Z01.818 PRE-OP TESTING: Primary | ICD-10-CM

## 2025-03-10 LAB
ANION GAP SERPL CALCULATED.3IONS-SCNC: 10.6 MMOL/L (ref 5–15)
BUN SERPL-MCNC: 13 MG/DL (ref 8–23)
BUN/CREAT SERPL: 20 (ref 7–25)
CALCIUM SPEC-SCNC: 10.2 MG/DL (ref 8.6–10.5)
CHLORIDE SERPL-SCNC: 105 MMOL/L (ref 98–107)
CO2 SERPL-SCNC: 23.4 MMOL/L (ref 22–29)
CREAT SERPL-MCNC: 0.65 MG/DL (ref 0.57–1)
EGFRCR SERPLBLD CKD-EPI 2021: 100.3 ML/MIN/1.73
GLUCOSE SERPL-MCNC: 122 MG/DL (ref 65–99)
POTASSIUM SERPL-SCNC: 4.2 MMOL/L (ref 3.5–5.2)
QT INTERVAL: 392 MS
QTC INTERVAL: 408 MS
SODIUM SERPL-SCNC: 139 MMOL/L (ref 136–145)

## 2025-03-10 PROCEDURE — 93005 ELECTROCARDIOGRAM TRACING: CPT | Performed by: PLASTIC SURGERY

## 2025-03-10 PROCEDURE — 93010 ELECTROCARDIOGRAM REPORT: CPT | Performed by: INTERNAL MEDICINE

## 2025-03-10 PROCEDURE — 80048 BASIC METABOLIC PNL TOTAL CA: CPT

## 2025-03-10 PROCEDURE — 36415 COLL VENOUS BLD VENIPUNCTURE: CPT

## 2025-04-08 ENCOUNTER — OFFICE VISIT (OUTPATIENT)
Dept: FAMILY MEDICINE CLINIC | Facility: CLINIC | Age: 60
End: 2025-04-08
Payer: MEDICARE

## 2025-04-08 VITALS
HEIGHT: 62 IN | HEART RATE: 84 BPM | OXYGEN SATURATION: 97 % | BODY MASS INDEX: 32.39 KG/M2 | WEIGHT: 176 LBS | DIASTOLIC BLOOD PRESSURE: 80 MMHG | SYSTOLIC BLOOD PRESSURE: 150 MMHG

## 2025-04-08 DIAGNOSIS — E78.5 HYPERLIPIDEMIA, UNSPECIFIED HYPERLIPIDEMIA TYPE: Primary | ICD-10-CM

## 2025-04-08 PROCEDURE — 99214 OFFICE O/P EST MOD 30 MIN: CPT | Performed by: INTERNAL MEDICINE

## 2025-04-08 PROCEDURE — G2211 COMPLEX E/M VISIT ADD ON: HCPCS | Performed by: INTERNAL MEDICINE

## 2025-04-08 RX ORDER — CHLORAL HYDRATE 500 MG
CAPSULE ORAL
COMMUNITY

## 2025-04-08 NOTE — PROGRESS NOTES
04/08/2025    My Summary of Visit   This pleasant 59-year-old presents at this time for discussion regarding her calcium CT score 7.  She had previously been very resistant to lowering her very elevated LDL with statins and decided to get a calcium CT to add more information to her decision making process.  She comes in today for discussion.  She denies any chest pain or shortness of breath.  There is a family history of elevated cholesterol.    Her CT cardiac calcium score was calculated as 111 with a higher concentration of vessel involvement seen in the LAD with 86 and also the RCA/PDA 25.  Her overall score was 111.  Her CAC-DR S risk category was 2    the number of vessels involved was 2 ( A2N2).  Her age/sex adjusted CAC percentile was 75 to 90%.  I explained to the patient that only 10 to 25% of patients would have a gradient total calcium coronary artery plaque burden.  I discussed that this put her in the moderately increased risk category and that the suggested treatment would be moderate to high intensity statin plus aspirin 81 mg.    The patient continues to refuse to go on statin and instead relates that she will adopt a stricter diet and indeed has lost 17 pounds over the past 4 months.  She has listed a number of measures including fasting increasing omega-3's increased exercise and spiritual help that she feels will lower her cholesterol.  I have indicated to her my belief that she needs to lower her her LDL aggressively now.  And I have offered to send her to an endocrinologist to consider means other than statin medications to lower her LDL.  In an effort to  measure her progress we will repeat her lipid profile in 6 months.      Assessment & Plan  1. Elevated LDL cholesterol.  Her LDL cholesterol levels have been consistently elevated. A calcium score test was conducted, revealing a score of 111, indicating significant plaque accumulation in the left anterior descending artery. This places her in  a higher risk category for heart disease. Despite the recommendation to start statin therapy, she prefers to continue with lifestyle modifications, including diet and exercise, and reassess in January 2026. She has lost 17 pounds since January 2025 and is making substantial efforts to improve her health. A referral to an endocrinologist will be made for further discussion on newer cholesterol-lowering medications and to address her concerns about statins. A follow-up cholesterol test will be scheduled in 6 months to monitor progress. She will undergo fasting blood work one week prior to her next appointment.        Results  Laboratory Studies  LDL cholesterol levels have been high.    Imaging  Calcium score was 111.         Plan:  1.)  Referral to endocrinology for discussion regarding lipid-lowering agents other than statins  2.)  Follow-up in 6 months with repeat lipid profile       Total time spent with the patient during this visit including review of her chart, discussing her situation with the patient, reviewing her calcium score, counseling and documenting was in excess of 30 minutes.    CC: Hyperlipidemia (Follow up Calcium CT done in Feb.---no other issues)  .        HPI  History of Present Illness   History of Present Illness  The patient is a 59-year-old female who presents for evaluation of her calcium score.    She has been adhering to the standard American diet for the past 58 years but made significant lifestyle modifications in 2021, including dietary changes and the incorporation of regular exercise. Since 01/01/2025, she has achieved a weight loss of 17 pounds. She expresses a desire to continue her current regimen until January 2026, at which point she would like to reassess her condition and consider the initiation of statin therapy if necessary. She is committed to demonstrating the potential for lifestyle and dietary changes to improve health outcomes. She has eliminated processed foods, seed  oils, and grains from her diet, and has successfully overcome a previous sugar addiction. She is currently on hormone replacement therapy, which she wishes to discontinue due to concerns about hot flashes. Additionally, she is taking an allergy medication.    Juan José Sena is a 59 y.o. female.      The following portions of the patient's history were reviewed and updated as appropriate: allergies, current medications, past family history, past medical history, past social history, past surgical history, and problem list.    Problem List  Patient Active Problem List   Diagnosis    DDD (degenerative disc disease), lumbar    Lumbar radiculopathy    Thoracic spinal stenosis    Thoracic radiculopathy    Spondylosis of lumbar region without myelopathy or radiculopathy       Past Medical History  Past Medical History:   Diagnosis Date    Asthma     Fall 2008    Low back pain     Osteoarthritis     Spondylosis of lumbar region without myelopathy or radiculopathy 07/27/2020    Thoracic radiculopathy 06/11/2020    Thoracic spinal stenosis 06/11/2020    Visual impairment        Surgical History  Past Surgical History:   Procedure Laterality Date    ANKLE SURGERY Left     BACK SURGERY N/A 07/01/2022    [facility] [place];DR.DAVID MARTIN    COLONOSCOPY  2024    Don't remember the date of the first one. Two since then were mail in ones with the last one being in 2024    ENDOMETRIAL ABLATION      EPIDURAL BLOCK      GANGLION CYST EXCISION      2012, 2014    NASAL SEPTUM SURGERY      2000's     SINUS SURGERY      SPINE SURGERY      TONSILLECTOMY         Family History  Family History   Problem Relation Age of Onset    Cancer Father     Early death Father     Thyroid disease Father     Down syndrome Son     Drug abuse Son     Early death Son     Diabetes Mother     Hearing loss Mother     Drug abuse Son        Social History  Social History    Tobacco Use      Smoking status: Never      Smokeless tobacco: Never       Is  the Patient a current tobacco user? No    Allergies  Allergies   Allergen Reactions    Betadine [Povidone Iodine] Hives    Phenobarbital Other (See Comments)    Tetanus Toxoid Other (See Comments)       Current Medications    Current Outpatient Medications:     acetaminophen (TYLENOL) 650 MG 8 hr tablet, Take  by mouth., Disp: , Rfl:     CALCIUM CARB-CHOLECALCIFEROL PO, Take  by mouth. 600/400MG, Disp: , Rfl:     Desiccated Beef Liver powder, Use. CAPSULES, Disp: , Rfl:     estradiol (ESTRACE) 0.1 MG/GM vaginal cream, Insert 1 g into the vagina 2 (Two) Times a Week., Disp: , Rfl:     estradiol (ESTRACE) 1 MG tablet, Take 1 tablet by mouth Daily., Disp: , Rfl:     fexofenadine (ALLEGRA) 180 MG tablet, fexofenadine 180 mg tablet  Take 1 tablet every day by oral route., Disp: , Rfl:     Ibuprofen 200 MG capsule, Take  by mouth., Disp: , Rfl:     Magnesium 100 MG capsule, Take  by mouth., Disp: , Rfl:     Menaquinone-7 (Vitamin K2) 100 MCG capsule, Take  by mouth., Disp: , Rfl:     naproxen sodium (ALEVE) 220 MG tablet, Take 1 tablet by mouth., Disp: , Rfl:     Omega-3 Fatty Acids (fish oil) 1000 MG capsule capsule, Take  by mouth., Disp: , Rfl:     Probiotic Product (PROBIOTIC DAILY PO), Take  by mouth., Disp: , Rfl:     progesterone (PROMETRIUM) 200 MG capsule, 1 capsule Daily., Disp: , Rfl:     VITAMIN D PO, Take  by mouth., Disp: , Rfl:     Krill Oil (Omega-3) 500 MG capsule, Take  by mouth. (Patient not taking: Reported on 4/8/2025), Disp: , Rfl:     Progesterone (PROMETRIUM) 200 MG capsule, Take 1 capsule by mouth Daily., Disp: , Rfl:      Review of System  Review of Systems   Constitutional:  Negative for chills and fever.   Respiratory:  Negative for cough and shortness of breath.    Cardiovascular:  Negative for chest pain and palpitations.   Gastrointestinal:  Negative for constipation, diarrhea, nausea and vomiting.   Neurological:  Negative for dizziness and headache.   I have reviewed and confirmed the  accuracy of the ROS as documented by the MA/LPN/RN Jacoby Torres MD    Vitals:    04/08/25 0849   BP: 150/80   Pulse: 84   SpO2: 97%     Body mass index is 32.19 kg/m².    Objective     Physical Exam  Physical Exam  Constitutional:       General: She is not in acute distress.     Appearance: Normal appearance.   HENT:      Head: Normocephalic and atraumatic.   Cardiovascular:      Rate and Rhythm: Normal rate and regular rhythm.   Pulmonary:      Effort: Pulmonary effort is normal. No respiratory distress.      Breath sounds: Normal breath sounds. No wheezing, rhonchi or rales.   Neurological:      General: No focal deficit present.      Mental Status: She is alert and oriented to person, place, and time.   Psychiatric:         Mood and Affect: Mood normal.         Behavior: Behavior normal.         Thought Content: Thought content normal.         Judgment: Judgment normal.       Patient or patient representative verbalized consent for the use of Ambient Listening during the visit with  Jacoby Torres MD for chart documentation. 4/8/2025  10:05 EDT    Jacoby Torres MD  04/08/2025

## 2025-05-02 ENCOUNTER — OFFICE VISIT (OUTPATIENT)
Dept: ENDOCRINOLOGY | Age: 60
End: 2025-05-02
Payer: MEDICARE

## 2025-05-02 VITALS
DIASTOLIC BLOOD PRESSURE: 82 MMHG | BODY MASS INDEX: 31.64 KG/M2 | SYSTOLIC BLOOD PRESSURE: 134 MMHG | OXYGEN SATURATION: 99 % | WEIGHT: 173 LBS | HEART RATE: 80 BPM | TEMPERATURE: 98.3 F

## 2025-05-02 DIAGNOSIS — E78.49 OTHER HYPERLIPIDEMIA: Primary | ICD-10-CM

## 2025-05-02 NOTE — ASSESSMENT & PLAN NOTE
and CT cardiac calcium score is consistent with Moderately increased risk of cardiovascular event   Deferred statin, ezetimibe and Repatha for now  Explained to patient that given her cardiac scan results she is at increased risk of having cardiovascular events.  She would like to avoid statin for now as her mother has Alzheimer disease  Explained to patient that keto diet could increase the risk of heart disease particularly by raising LDL cholesterol levels.    Advised to call office if she would like to try treatment for hyperlipidemia

## 2025-05-02 NOTE — PROGRESS NOTES
"Chief Complaint/ reason for consult    Hyperlipidemia    History of Present Illness    Referred for further management of hyperlipidemia    Has hyperlipidemia, not taking any medication  No history of thyroid disease  Never been diagnosed with diabetes or prediabetes  No family history of premature coronary artery disease  Never tried  statin/refused statin  Not willing to try any medication for hyperlipidemia  States that she is watching her diet and trying to avoid medications.   She is on keto diet  She does not smoke   CT cardiac calcium score: The total Agatston coronary artery calcium score is 111.  Consistent with moderately increased risk. Suggest treatment: Moderate to high intensity statin plus aspirin      Component      Latest Ref Rng 1/20/2023 7/5/2024 2/6/2025 3/10/2025   Glucose      65 - 99 mg/dL    122 (H)    BUN      8 - 23 mg/dL    13    Creatinine      0.57 - 1.00 mg/dL    0.65    Sodium      136 - 145 mmol/L    139    Potassium      3.5 - 5.2 mmol/L    4.2    Chloride      98 - 107 mmol/L    105    CO2      22.0 - 29.0 mmol/L    23.4    Calcium      8.6 - 10.5 mg/dL    10.2    BUN/Creatinine Ratio      7.0 - 25.0     20.0    Anion Gap      5.0 - 15.0 mmol/L    10.6    eGFR      >60.0 mL/min/1.73    100.3    Total Cholesterol      0 - 200 mg/dL  245 (H)  234 (H)     Triglycerides      0 - 150 mg/dL  89  74     HDL Cholesterol      40 - 60 mg/dL  81 (H)  76 (H)     VLDL Cholesterol Ramiro      5 - 40 mg/dL  15  13     LDL Cholesterol       0 - 100 mg/dL  149 (H)  145 (H)     Chol/HDL Ratio  3.02      TSH Baseline      0.270 - 4.200 uIU/mL 2.600          Legend:  (H) High    Objective   Vital Signs:  /82   Pulse 80   Temp 98.3 °F (36.8 °C) (Oral)   Wt 78.5 kg (173 lb)   SpO2 99%   BMI 31.64 kg/m²   Estimated body mass index is 31.64 kg/m² as calculated from the following:    Height as of 4/8/25: 157.5 cm (62\").    Weight as of this encounter: 78.5 kg (173 lb).                 Physical " Exam  Constitutional:       Appearance: She is obese.   Cardiovascular:      Rate and Rhythm: Normal rate.   Pulmonary:      Effort: Pulmonary effort is normal.      Breath sounds: Normal breath sounds. No wheezing.   Abdominal:      Palpations: Abdomen is soft.      Tenderness: There is no abdominal tenderness.   Musculoskeletal:         General: No swelling.      Cervical back: Neck supple. No tenderness.   Neurological:      Mental Status: She is alert and oriented to person, place, and time.   Psychiatric:         Mood and Affect: Mood normal.        Result Review :  The following data was reviewed by: Mahrokh Nokhbehzaeim, MD on 05/02/2025:  CMP          7/5/2024    10:53 3/10/2025    10:22   CMP   Glucose 112  122    BUN 10  13    Creatinine 0.70  0.65    EGFR 99.2  100.3    Sodium 138  139    Potassium 4.3  4.2    Chloride 104  105    Calcium 10.1  10.2    Total Protein 6.5     Albumin 4.6     Globulin 1.9     Total Bilirubin 0.3     Alkaline Phosphatase 68     AST (SGOT) 16     ALT (SGPT) 11     Albumin/Globulin Ratio 2.4     BUN/Creatinine Ratio 14.3  20.0    Anion Gap  10.6      CMP          7/5/2024    10:53 3/10/2025    10:22   CMP   Glucose 112  122    BUN 10  13    Creatinine 0.70  0.65    EGFR 99.2  100.3    Sodium 138  139    Potassium 4.3  4.2    Chloride 104  105    Calcium 10.1  10.2    Total Protein 6.5     Albumin 4.6     Globulin 1.9     Total Bilirubin 0.3     Alkaline Phosphatase 68     AST (SGOT) 16     ALT (SGPT) 11     Albumin/Globulin Ratio 2.4     BUN/Creatinine Ratio 14.3  20.0    Anion Gap  10.6       Lipid Panel          7/5/2024    10:53 2/6/2025    11:45   Lipid Panel   Total Cholesterol 245  234    Triglycerides 89  74    HDL Cholesterol 81  76    VLDL Cholesterol 15  13    LDL Cholesterol  149  145       Data reviewed : Radiologic studies CT cardiac calcium score scan              Assessment and Plan   Diagnoses and all orders for this visit:    1. Other hyperlipidemia  (Primary)  Assessment & Plan:    and CT cardiac calcium score is consistent with Moderately increased risk of cardiovascular event   Deferred statin, ezetimibe and Repatha for now  Explained to patient that given her cardiac scan results she is at increased risk of having cardiovascular events.  She would like to avoid statin for now as her mother has Alzheimer disease  Explained to patient that keto diet could increase the risk of heart disease particularly by raising LDL cholesterol levels.    Advised to call office if she would like to try treatment for hyperlipidemia               Follow Up   No follow-ups on file.  Patient was given instructions and counseling regarding her condition or for health maintenance advice. Please see specific information pulled into the AVS if appropriate.